# Patient Record
Sex: FEMALE | Race: WHITE | HISPANIC OR LATINO | Employment: STUDENT | URBAN - NONMETROPOLITAN AREA
[De-identification: names, ages, dates, MRNs, and addresses within clinical notes are randomized per-mention and may not be internally consistent; named-entity substitution may affect disease eponyms.]

---

## 2023-08-01 ENCOUNTER — OFFICE VISIT (OUTPATIENT)
Dept: PSYCHIATRY | Facility: CLINIC | Age: 23
End: 2023-08-01
Payer: COMMERCIAL

## 2023-08-01 VITALS
BODY MASS INDEX: 40.34 KG/M2 | WEIGHT: 257 LBS | SYSTOLIC BLOOD PRESSURE: 132 MMHG | HEART RATE: 92 BPM | HEIGHT: 67 IN | DIASTOLIC BLOOD PRESSURE: 82 MMHG

## 2023-08-01 DIAGNOSIS — F33.0 MILD RECURRENT MAJOR DEPRESSION: ICD-10-CM

## 2023-08-01 DIAGNOSIS — Z79.899 MEDICATION MANAGEMENT: ICD-10-CM

## 2023-08-01 DIAGNOSIS — F90.8 ATTENTION DEFICIT HYPERACTIVITY DISORDER (ADHD), OTHER TYPE: Primary | ICD-10-CM

## 2023-08-01 DIAGNOSIS — F41.1 GAD (GENERALIZED ANXIETY DISORDER): ICD-10-CM

## 2023-08-01 PROCEDURE — 1159F MED LIST DOCD IN RCRD: CPT | Performed by: NURSE PRACTITIONER

## 2023-08-01 PROCEDURE — 1160F RVW MEDS BY RX/DR IN RCRD: CPT | Performed by: NURSE PRACTITIONER

## 2023-08-01 PROCEDURE — 90792 PSYCH DIAG EVAL W/MED SRVCS: CPT | Performed by: NURSE PRACTITIONER

## 2023-08-01 RX ORDER — SERTRALINE HCL 50 MG
50 TABLET ORAL DAILY
COMMUNITY
Start: 2022-08-16

## 2023-08-01 RX ORDER — SYRINGE WITH NEEDLE, 1 ML 25GX5/8"
SYRINGE, EMPTY DISPOSABLE MISCELLANEOUS
COMMUNITY
Start: 2023-06-11

## 2023-08-01 RX ORDER — ATOMOXETINE 40 MG/1
40 CAPSULE ORAL DAILY
COMMUNITY
Start: 2022-08-16

## 2023-08-01 RX ORDER — TESTOSTERONE CYPIONATE 100 MG/ML
0.75 VIAL (ML) INTRAMUSCULAR WEEKLY
COMMUNITY
Start: 2022-07-13

## 2023-08-01 RX ORDER — HYDROXYZINE HYDROCHLORIDE 10 MG/1
10 TABLET, FILM COATED ORAL NIGHTLY PRN
COMMUNITY
Start: 2023-04-21

## 2023-08-08 ENCOUNTER — TELEPHONE (OUTPATIENT)
Dept: PSYCHIATRY | Facility: CLINIC | Age: 23
End: 2023-08-08
Payer: COMMERCIAL

## 2023-08-08 DIAGNOSIS — F90.8 ATTENTION DEFICIT HYPERACTIVITY DISORDER (ADHD), OTHER TYPE: Primary | ICD-10-CM

## 2023-08-17 LAB — DRUGS UR: NORMAL

## 2023-09-26 ENCOUNTER — TELEPHONE (OUTPATIENT)
Dept: PSYCHIATRY | Facility: CLINIC | Age: 23
End: 2023-09-26

## 2023-10-09 ENCOUNTER — TELEMEDICINE (OUTPATIENT)
Dept: PSYCHIATRY | Facility: CLINIC | Age: 23
End: 2023-10-09
Payer: COMMERCIAL

## 2023-10-09 DIAGNOSIS — F90.8 ATTENTION DEFICIT HYPERACTIVITY DISORDER (ADHD), OTHER TYPE: Primary | Chronic | ICD-10-CM

## 2023-10-09 DIAGNOSIS — F33.0 MILD RECURRENT MAJOR DEPRESSION: Chronic | ICD-10-CM

## 2023-10-09 DIAGNOSIS — F41.1 GAD (GENERALIZED ANXIETY DISORDER): Chronic | ICD-10-CM

## 2023-10-09 PROCEDURE — 1160F RVW MEDS BY RX/DR IN RCRD: CPT | Performed by: NURSE PRACTITIONER

## 2023-10-09 PROCEDURE — 1159F MED LIST DOCD IN RCRD: CPT | Performed by: NURSE PRACTITIONER

## 2023-10-09 PROCEDURE — 99214 OFFICE O/P EST MOD 30 MIN: CPT | Performed by: NURSE PRACTITIONER

## 2023-10-09 RX ORDER — NEEDLES, DISPOSABLE 25GX5/8"
NEEDLE, DISPOSABLE MISCELLANEOUS
COMMUNITY
Start: 2023-09-22

## 2023-10-09 RX ORDER — LISDEXAMFETAMINE DIMESYLATE CAPSULES 40 MG/1
40 CAPSULE ORAL EVERY MORNING
Qty: 30 CAPSULE | Refills: 0 | Status: SHIPPED | OUTPATIENT
Start: 2023-10-09

## 2023-10-09 RX ORDER — LORATADINE 10 MG
TABLET ORAL
COMMUNITY
Start: 2023-09-22

## 2023-10-09 RX ORDER — SERTRALINE HYDROCHLORIDE 100 MG/1
100 TABLET, FILM COATED ORAL DAILY
Qty: 30 TABLET | Refills: 2 | Status: SHIPPED | OUTPATIENT
Start: 2023-10-09 | End: 2024-10-08

## 2023-10-09 RX ORDER — HYDROXYZINE HYDROCHLORIDE 10 MG/1
10 TABLET, FILM COATED ORAL NIGHTLY PRN
Qty: 30 TABLET | Refills: 2 | Status: SHIPPED | OUTPATIENT
Start: 2023-10-09

## 2023-12-11 ENCOUNTER — TELEMEDICINE (OUTPATIENT)
Dept: PSYCHIATRY | Facility: CLINIC | Age: 23
End: 2023-12-11
Payer: COMMERCIAL

## 2023-12-11 DIAGNOSIS — F90.8 ATTENTION DEFICIT HYPERACTIVITY DISORDER (ADHD), OTHER TYPE: Chronic | ICD-10-CM

## 2023-12-11 DIAGNOSIS — F33.1 MODERATE RECURRENT MAJOR DEPRESSION: Primary | Chronic | ICD-10-CM

## 2023-12-11 DIAGNOSIS — F41.1 GAD (GENERALIZED ANXIETY DISORDER): Chronic | ICD-10-CM

## 2023-12-11 PROCEDURE — 99214 OFFICE O/P EST MOD 30 MIN: CPT | Performed by: NURSE PRACTITIONER

## 2023-12-11 PROCEDURE — 1159F MED LIST DOCD IN RCRD: CPT | Performed by: NURSE PRACTITIONER

## 2023-12-11 PROCEDURE — 1160F RVW MEDS BY RX/DR IN RCRD: CPT | Performed by: NURSE PRACTITIONER

## 2023-12-11 RX ORDER — TESTOSTERONE CYPIONATE 200 MG/ML
INJECTION, SOLUTION INTRAMUSCULAR
COMMUNITY
Start: 2023-12-05

## 2023-12-11 RX ORDER — LISDEXAMFETAMINE DIMESYLATE CAPSULES 40 MG/1
40 CAPSULE ORAL EVERY MORNING
Qty: 30 CAPSULE | Refills: 0 | Status: SHIPPED | OUTPATIENT
Start: 2023-12-11

## 2023-12-11 RX ORDER — BUPROPION HYDROCHLORIDE 150 MG/1
150 TABLET ORAL DAILY
Qty: 30 TABLET | Refills: 2 | Status: SHIPPED | OUTPATIENT
Start: 2023-12-11

## 2024-01-10 DIAGNOSIS — F90.8 ATTENTION DEFICIT HYPERACTIVITY DISORDER (ADHD), OTHER TYPE: Chronic | ICD-10-CM

## 2024-01-10 DIAGNOSIS — F33.1 MODERATE RECURRENT MAJOR DEPRESSION: Chronic | ICD-10-CM

## 2024-01-10 RX ORDER — BUPROPION HYDROCHLORIDE 150 MG/1
150 TABLET ORAL DAILY
Qty: 30 TABLET | Refills: 2 | OUTPATIENT
Start: 2024-01-10

## 2024-01-10 RX ORDER — LISDEXAMFETAMINE DIMESYLATE CAPSULES 40 MG/1
40 CAPSULE ORAL EVERY MORNING
Qty: 30 CAPSULE | Refills: 0 | Status: SHIPPED | OUTPATIENT
Start: 2024-01-10

## 2024-02-01 ENCOUNTER — E-VISIT (OUTPATIENT)
Dept: FAMILY MEDICINE CLINIC | Facility: TELEHEALTH | Age: 24
End: 2024-02-01
Payer: COMMERCIAL

## 2024-02-01 ENCOUNTER — TELEMEDICINE (OUTPATIENT)
Dept: PSYCHIATRY | Facility: CLINIC | Age: 24
End: 2024-02-01
Payer: COMMERCIAL

## 2024-02-01 DIAGNOSIS — F41.1 GAD (GENERALIZED ANXIETY DISORDER): Chronic | ICD-10-CM

## 2024-02-01 DIAGNOSIS — F90.8 ATTENTION DEFICIT HYPERACTIVITY DISORDER (ADHD), OTHER TYPE: Primary | Chronic | ICD-10-CM

## 2024-02-01 DIAGNOSIS — F33.0 MILD RECURRENT MAJOR DEPRESSION: Chronic | ICD-10-CM

## 2024-02-01 PROCEDURE — 1159F MED LIST DOCD IN RCRD: CPT | Performed by: NURSE PRACTITIONER

## 2024-02-01 PROCEDURE — 99214 OFFICE O/P EST MOD 30 MIN: CPT | Performed by: NURSE PRACTITIONER

## 2024-02-01 PROCEDURE — 1160F RVW MEDS BY RX/DR IN RCRD: CPT | Performed by: NURSE PRACTITIONER

## 2024-02-01 RX ORDER — LISDEXAMFETAMINE DIMESYLATE CAPSULES 50 MG/1
50 CAPSULE ORAL EVERY MORNING
Qty: 30 CAPSULE | Refills: 0 | Status: SHIPPED | OUTPATIENT
Start: 2024-02-01

## 2024-02-16 ENCOUNTER — TELEMEDICINE (OUTPATIENT)
Dept: FAMILY MEDICINE CLINIC | Facility: TELEHEALTH | Age: 24
End: 2024-02-16
Payer: COMMERCIAL

## 2024-02-16 VITALS — WEIGHT: 257 LBS | HEIGHT: 55 IN | BODY MASS INDEX: 59.47 KG/M2

## 2024-02-16 DIAGNOSIS — T78.40XA ALLERGIC REACTION, INITIAL ENCOUNTER: Primary | ICD-10-CM

## 2024-02-16 PROBLEM — Z91.048: Status: ACTIVE | Noted: 2024-02-15

## 2024-02-16 RX ORDER — PREDNISONE 20 MG/1
20 TABLET ORAL 2 TIMES DAILY
Qty: 6 TABLET | Refills: 0 | Status: SHIPPED | OUTPATIENT
Start: 2024-02-16 | End: 2024-02-19

## 2024-02-16 RX ORDER — DIPHENHYDRAMINE HCL 25 MG
25 TABLET ORAL NIGHTLY PRN
Qty: 24 TABLET | Refills: 0 | Status: SHIPPED | OUTPATIENT
Start: 2024-02-16

## 2024-02-23 DIAGNOSIS — F90.8 ATTENTION DEFICIT HYPERACTIVITY DISORDER (ADHD), OTHER TYPE: Chronic | ICD-10-CM

## 2024-02-23 RX ORDER — LISDEXAMFETAMINE DIMESYLATE CAPSULES 50 MG/1
50 CAPSULE ORAL EVERY MORNING
Qty: 30 CAPSULE | Refills: 0 | OUTPATIENT
Start: 2024-02-23

## 2024-03-03 DIAGNOSIS — F33.1 MODERATE RECURRENT MAJOR DEPRESSION: Chronic | ICD-10-CM

## 2024-03-04 ENCOUNTER — TELEPHONE (OUTPATIENT)
Dept: PSYCHIATRY | Facility: CLINIC | Age: 24
End: 2024-03-04
Payer: COMMERCIAL

## 2024-03-04 RX ORDER — BUPROPION HYDROCHLORIDE 150 MG/1
150 TABLET ORAL DAILY
Qty: 30 TABLET | Refills: 2 | Status: SHIPPED | OUTPATIENT
Start: 2024-03-04

## 2024-03-25 ENCOUNTER — E-VISIT (OUTPATIENT)
Dept: FAMILY MEDICINE CLINIC | Facility: TELEHEALTH | Age: 24
End: 2024-03-25
Payer: COMMERCIAL

## 2024-04-03 ENCOUNTER — TELEPHONE (OUTPATIENT)
Dept: INTERNAL MEDICINE | Facility: CLINIC | Age: 24
End: 2024-04-03
Payer: COMMERCIAL

## 2024-04-09 ENCOUNTER — TELEMEDICINE (OUTPATIENT)
Dept: PSYCHIATRY | Facility: CLINIC | Age: 24
End: 2024-04-09
Payer: COMMERCIAL

## 2024-04-09 DIAGNOSIS — F33.1 MODERATE RECURRENT MAJOR DEPRESSION: Primary | Chronic | ICD-10-CM

## 2024-04-09 DIAGNOSIS — F41.1 GAD (GENERALIZED ANXIETY DISORDER): Chronic | ICD-10-CM

## 2024-04-09 DIAGNOSIS — F90.8 ATTENTION DEFICIT HYPERACTIVITY DISORDER (ADHD), OTHER TYPE: Chronic | ICD-10-CM

## 2024-04-09 PROCEDURE — 1159F MED LIST DOCD IN RCRD: CPT | Performed by: NURSE PRACTITIONER

## 2024-04-09 PROCEDURE — 99214 OFFICE O/P EST MOD 30 MIN: CPT | Performed by: NURSE PRACTITIONER

## 2024-04-09 PROCEDURE — 1160F RVW MEDS BY RX/DR IN RCRD: CPT | Performed by: NURSE PRACTITIONER

## 2024-04-09 RX ORDER — LISDEXAMFETAMINE DIMESYLATE CAPSULES 50 MG/1
50 CAPSULE ORAL EVERY MORNING
Qty: 30 CAPSULE | Refills: 0 | Status: SHIPPED | OUTPATIENT
Start: 2024-04-09 | End: 2024-04-10 | Stop reason: SDUPTHER

## 2024-04-10 DIAGNOSIS — F90.8 ATTENTION DEFICIT HYPERACTIVITY DISORDER (ADHD), OTHER TYPE: Chronic | ICD-10-CM

## 2024-04-10 RX ORDER — LISDEXAMFETAMINE DIMESYLATE CAPSULES 50 MG/1
50 CAPSULE ORAL EVERY MORNING
Qty: 30 CAPSULE | Refills: 0 | Status: SHIPPED | OUTPATIENT
Start: 2024-04-10

## 2024-04-12 ENCOUNTER — OFFICE VISIT (OUTPATIENT)
Dept: INTERNAL MEDICINE | Facility: CLINIC | Age: 24
End: 2024-04-12
Payer: COMMERCIAL

## 2024-04-12 VITALS
TEMPERATURE: 98.2 F | OXYGEN SATURATION: 97 % | HEIGHT: 67 IN | DIASTOLIC BLOOD PRESSURE: 84 MMHG | HEART RATE: 78 BPM | BODY MASS INDEX: 39.24 KG/M2 | SYSTOLIC BLOOD PRESSURE: 134 MMHG | WEIGHT: 250 LBS

## 2024-04-12 DIAGNOSIS — E03.9 HYPOTHYROIDISM, UNSPECIFIED TYPE: ICD-10-CM

## 2024-04-12 DIAGNOSIS — Z13.29 SCREENING FOR ENDOCRINE, METABOLIC AND IMMUNITY DISORDER: ICD-10-CM

## 2024-04-12 DIAGNOSIS — E55.9 VITAMIN D DEFICIENCY: ICD-10-CM

## 2024-04-12 DIAGNOSIS — Z76.89 ENCOUNTER TO ESTABLISH CARE: Primary | ICD-10-CM

## 2024-04-12 DIAGNOSIS — R06.83 SNORES: ICD-10-CM

## 2024-04-12 DIAGNOSIS — Z13.0 SCREENING FOR ENDOCRINE, METABOLIC AND IMMUNITY DISORDER: ICD-10-CM

## 2024-04-12 DIAGNOSIS — Z13.228 SCREENING FOR ENDOCRINE, METABOLIC AND IMMUNITY DISORDER: ICD-10-CM

## 2024-04-12 DIAGNOSIS — R53.83 OTHER FATIGUE: ICD-10-CM

## 2024-04-12 DIAGNOSIS — L65.9 HAIR LOSS: ICD-10-CM

## 2024-04-12 DIAGNOSIS — Z13.0 SCREENING FOR DISORDER OF BLOOD AND BLOOD-FORMING ORGANS: ICD-10-CM

## 2024-04-12 DIAGNOSIS — R06.81 WITNESSED EPISODE OF APNEA: ICD-10-CM

## 2024-04-15 ENCOUNTER — PATIENT MESSAGE (OUTPATIENT)
Dept: INTERNAL MEDICINE | Facility: CLINIC | Age: 24
End: 2024-04-15
Payer: COMMERCIAL

## 2024-04-15 DIAGNOSIS — E55.9 VITAMIN D DEFICIENCY: Primary | ICD-10-CM

## 2024-04-15 LAB
25(OH)D3+25(OH)D2 SERPL-MCNC: 12.7 NG/ML (ref 30–100)
ALBUMIN SERPL-MCNC: 4.5 G/DL (ref 4–5)
ALBUMIN/GLOB SERPL: 1.7 {RATIO} (ref 1.2–2.2)
ALP SERPL-CCNC: 149 IU/L (ref 44–121)
ALT SERPL-CCNC: 39 IU/L (ref 0–32)
AST SERPL-CCNC: 23 IU/L (ref 0–40)
BASOPHILS # BLD AUTO: 0.1 X10E3/UL (ref 0–0.2)
BASOPHILS NFR BLD AUTO: 1 %
BILIRUB SERPL-MCNC: 0.6 MG/DL (ref 0–1.2)
BUN SERPL-MCNC: 10 MG/DL (ref 6–20)
BUN/CREAT SERPL: 8 (ref 9–23)
CALCIUM SERPL-MCNC: 9.5 MG/DL (ref 8.7–10.2)
CHLORIDE SERPL-SCNC: 101 MMOL/L (ref 96–106)
CO2 SERPL-SCNC: 25 MMOL/L (ref 20–29)
CREAT SERPL-MCNC: 1.19 MG/DL (ref 0.57–1)
EGFRCR SERPLBLD CKD-EPI 2021: 66 ML/MIN/1.73
EOSINOPHIL # BLD AUTO: 0.2 X10E3/UL (ref 0–0.4)
EOSINOPHIL NFR BLD AUTO: 2 %
ERYTHROCYTE [DISTWIDTH] IN BLOOD BY AUTOMATED COUNT: 12.6 % (ref 11.7–15.4)
FERRITIN SERPL-MCNC: 123 NG/ML (ref 15–150)
FOLATE SERPL-MCNC: 4.5 NG/ML
GLOBULIN SER CALC-MCNC: 2.7 G/DL (ref 1.5–4.5)
GLUCOSE SERPL-MCNC: 70 MG/DL (ref 70–99)
HBA1C MFR BLD: 5.4 % (ref 4.8–5.6)
HCT VFR BLD AUTO: 49.4 % (ref 34–46.6)
HGB BLD-MCNC: 15.5 G/DL (ref 11.1–15.9)
IMM GRANULOCYTES # BLD AUTO: 0 X10E3/UL (ref 0–0.1)
IMM GRANULOCYTES NFR BLD AUTO: 0 %
IRON SERPL-MCNC: 78 UG/DL (ref 27–159)
LYMPHOCYTES # BLD AUTO: 2.7 X10E3/UL (ref 0.7–3.1)
LYMPHOCYTES NFR BLD AUTO: 28 %
MCH RBC QN AUTO: 25.1 PG (ref 26.6–33)
MCHC RBC AUTO-ENTMCNC: 31.4 G/DL (ref 31.5–35.7)
MCV RBC AUTO: 80 FL (ref 79–97)
MONOCYTES # BLD AUTO: 0.8 X10E3/UL (ref 0.1–0.9)
MONOCYTES NFR BLD AUTO: 8 %
NEUTROPHILS # BLD AUTO: 5.9 X10E3/UL (ref 1.4–7)
NEUTROPHILS NFR BLD AUTO: 61 %
PLATELET # BLD AUTO: 283 X10E3/UL (ref 150–450)
POTASSIUM SERPL-SCNC: 4.5 MMOL/L (ref 3.5–5.2)
PROT SERPL-MCNC: 7.2 G/DL (ref 6–8.5)
RBC # BLD AUTO: 6.17 X10E6/UL (ref 3.77–5.28)
RPR SER QL: NON REACTIVE
SODIUM SERPL-SCNC: 142 MMOL/L (ref 134–144)
T3 SERPL-MCNC: 151 NG/DL (ref 71–180)
T3FREE SERPL-MCNC: 4 PG/ML (ref 2–4.4)
T4 FREE SERPL-MCNC: 1.13 NG/DL (ref 0.82–1.77)
THYROGLOB AB SERPL-ACNC: <1 IU/ML (ref 0–0.9)
THYROPEROXIDASE AB SERPL-ACNC: 15 IU/ML (ref 0–34)
TSH SERPL DL<=0.005 MIU/L-ACNC: 6.88 UIU/ML (ref 0.45–4.5)
VIT B12 SERPL-MCNC: 460 PG/ML (ref 232–1245)
WBC # BLD AUTO: 9.7 X10E3/UL (ref 3.4–10.8)
ZINC SERPL-MCNC: 79 UG/DL (ref 44–115)

## 2024-04-15 RX ORDER — LEVOTHYROXINE SODIUM 0.05 MG/1
50 TABLET ORAL DAILY
Qty: 30 TABLET | Refills: 1 | Status: SHIPPED | OUTPATIENT
Start: 2024-04-15

## 2024-04-15 RX ORDER — ERGOCALCIFEROL 1.25 MG/1
50000 CAPSULE ORAL WEEKLY
Qty: 12 CAPSULE | Refills: 1 | Status: SHIPPED | OUTPATIENT
Start: 2024-04-15

## 2024-04-17 DIAGNOSIS — E55.9 VITAMIN D DEFICIENCY: ICD-10-CM

## 2024-06-14 DIAGNOSIS — E03.9 HYPOTHYROIDISM, UNSPECIFIED TYPE: ICD-10-CM

## 2024-06-14 RX ORDER — LEVOTHYROXINE SODIUM 0.05 MG/1
50 TABLET ORAL DAILY
Qty: 30 TABLET | Refills: 1 | Status: SHIPPED | OUTPATIENT
Start: 2024-06-14

## 2024-07-03 DIAGNOSIS — F33.1 MODERATE RECURRENT MAJOR DEPRESSION: Chronic | ICD-10-CM

## 2024-07-03 RX ORDER — BUPROPION HYDROCHLORIDE 150 MG/1
150 TABLET ORAL DAILY
Qty: 30 TABLET | Refills: 2 | Status: SHIPPED | OUTPATIENT
Start: 2024-07-03

## 2024-07-08 ENCOUNTER — TELEMEDICINE (OUTPATIENT)
Dept: PSYCHIATRY | Facility: CLINIC | Age: 24
End: 2024-07-08
Payer: COMMERCIAL

## 2024-07-08 DIAGNOSIS — F90.8 ATTENTION DEFICIT HYPERACTIVITY DISORDER (ADHD), OTHER TYPE: Primary | Chronic | ICD-10-CM

## 2024-07-08 DIAGNOSIS — F33.0 MILD RECURRENT MAJOR DEPRESSION: Chronic | ICD-10-CM

## 2024-07-08 DIAGNOSIS — F41.1 GAD (GENERALIZED ANXIETY DISORDER): Chronic | ICD-10-CM

## 2024-07-08 PROCEDURE — 1159F MED LIST DOCD IN RCRD: CPT | Performed by: NURSE PRACTITIONER

## 2024-07-08 PROCEDURE — 1160F RVW MEDS BY RX/DR IN RCRD: CPT | Performed by: NURSE PRACTITIONER

## 2024-07-08 PROCEDURE — 99214 OFFICE O/P EST MOD 30 MIN: CPT | Performed by: NURSE PRACTITIONER

## 2024-07-08 RX ORDER — DEXTROAMPHETAMINE SACCHARATE, AMPHETAMINE ASPARTATE MONOHYDRATE, DEXTROAMPHETAMINE SULFATE AND AMPHETAMINE SULFATE 5; 5; 5; 5 MG/1; MG/1; MG/1; MG/1
20 CAPSULE, EXTENDED RELEASE ORAL DAILY
Qty: 30 CAPSULE | Refills: 0 | Status: SHIPPED | OUTPATIENT
Start: 2024-07-08 | End: 2025-07-08

## 2024-08-26 ENCOUNTER — TELEMEDICINE (OUTPATIENT)
Dept: PSYCHIATRY | Facility: CLINIC | Age: 24
End: 2024-08-26
Payer: COMMERCIAL

## 2024-08-26 DIAGNOSIS — F41.1 GAD (GENERALIZED ANXIETY DISORDER): Primary | Chronic | ICD-10-CM

## 2024-08-26 DIAGNOSIS — F33.0 MILD RECURRENT MAJOR DEPRESSION: Chronic | ICD-10-CM

## 2024-08-26 DIAGNOSIS — F90.8 ATTENTION DEFICIT HYPERACTIVITY DISORDER (ADHD), OTHER TYPE: Chronic | ICD-10-CM

## 2024-08-26 DIAGNOSIS — Z79.899 MEDICATION MANAGEMENT: ICD-10-CM

## 2024-08-26 PROCEDURE — 1160F RVW MEDS BY RX/DR IN RCRD: CPT | Performed by: NURSE PRACTITIONER

## 2024-08-26 PROCEDURE — 99214 OFFICE O/P EST MOD 30 MIN: CPT | Performed by: NURSE PRACTITIONER

## 2024-08-26 PROCEDURE — 1159F MED LIST DOCD IN RCRD: CPT | Performed by: NURSE PRACTITIONER

## 2024-08-26 RX ORDER — BUSPIRONE HYDROCHLORIDE 10 MG/1
10 TABLET ORAL 2 TIMES DAILY PRN
Qty: 60 TABLET | Refills: 2 | Status: SHIPPED | OUTPATIENT
Start: 2024-08-26

## 2024-08-26 RX ORDER — DEXTROAMPHETAMINE SACCHARATE, AMPHETAMINE ASPARTATE MONOHYDRATE, DEXTROAMPHETAMINE SULFATE AND AMPHETAMINE SULFATE 5; 5; 5; 5 MG/1; MG/1; MG/1; MG/1
20 CAPSULE, EXTENDED RELEASE ORAL DAILY
Qty: 30 CAPSULE | Refills: 0 | Status: SHIPPED | OUTPATIENT
Start: 2024-08-26 | End: 2025-08-26

## 2024-09-17 DIAGNOSIS — E55.9 VITAMIN D DEFICIENCY: ICD-10-CM

## 2024-09-17 RX ORDER — ERGOCALCIFEROL 1.25 MG/1
50000 CAPSULE, LIQUID FILLED ORAL
Qty: 12 CAPSULE | Refills: 1 | Status: SHIPPED | OUTPATIENT
Start: 2024-09-17

## 2024-10-10 DIAGNOSIS — E03.9 HYPOTHYROIDISM, UNSPECIFIED TYPE: ICD-10-CM

## 2024-10-10 DIAGNOSIS — F33.1 MODERATE RECURRENT MAJOR DEPRESSION: Chronic | ICD-10-CM

## 2024-10-10 RX ORDER — BUPROPION HYDROCHLORIDE 150 MG/1
150 TABLET ORAL DAILY
Qty: 30 TABLET | Refills: 2 | Status: SHIPPED | OUTPATIENT
Start: 2024-10-10

## 2024-10-10 RX ORDER — LEVOTHYROXINE SODIUM 50 UG/1
50 TABLET ORAL DAILY
Qty: 30 TABLET | Refills: 1 | OUTPATIENT
Start: 2024-10-10

## 2024-10-21 DIAGNOSIS — F90.9 ATTENTION DEFICIT HYPERACTIVITY DISORDER (ADHD), UNSPECIFIED ADHD TYPE: Primary | ICD-10-CM

## 2024-10-21 DIAGNOSIS — Z79.899 MEDICATION MANAGEMENT: ICD-10-CM

## 2024-10-21 DIAGNOSIS — F41.1 GAD (GENERALIZED ANXIETY DISORDER): ICD-10-CM

## 2024-10-23 NOTE — PROGRESS NOTES
"This provider is located at [facility] ,[address], [address] in [place], [place],using a secure MyChart Video Visit through App[employer]. Patient is being seen remotely via telehealth at their home address in [place], and stated they are in a secure environment for this session. The patient's condition being diagnosed/treated is appropriate for telemedicine. The provider identified herself as well as her credentials.   The patient, and/or patients guardian, consent to be seen remotely, and when consent is given they understand that the consent allows for patient identifiable information to be sent to a third party as needed.   They may refuse to be seen remotely at any time. The electronic data is encrypted and password protected, and the patient and/or guardian has been advised of the potential risks to privacy not withstanding such measures.    Chief Complaint  Depression, anxiety, and ADHD combined type      Subjective          Carrie Ramirez presents via MyChart through Epic by himself for a follow up and medication check. The patient prefers pronouns he/him.     History of Present Illness: Carrie states, \"I am good.\"  Carrie tells me that they are very busy between work and taking graduate classes.  They work 4 days a week and 5-hour shifts.  They recently changed from a private office setting to a shared space which has been an adjustment.  There are times they can be very reactive with others.  They are unsure if it is related to depression, anxiety, or difficulty with regulating their nervous system.  They find themselves to be \"snappy\" and their reactions often exceed the situation.  This leads to guilt feelings.  They do know that stress can be a contributing factor.  Their current relationship has improved which is also helpful.  They are sleeping since changing from Vyvanse to Adderall.  The BuSpar has been helpful for anxiety.  There are times in the evening they " "feel overly hungry and may overeat when the Adderall is no longer in effect.  He is taking Buspar, Wellbutrin XL, Adderall XR, and Hydroxyzine.  He denies any SI/HI/AVH.    Current Medications:   Current Outpatient Medications   Medication Sig Dispense Refill    amphetamine-dextroamphetamine XR (ADDERALL XR) 20 MG 24 hr capsule Take 1 capsule by mouth Daily 30 capsule 0    B-D 3CC LUER-GAIL SYR 25GX5/8\" 25G X 5/8\" 3 ML misc USE FOR SUBCUTANEOUS INJECTION OF MEDICATION WEEKLY AS DIRECTED      BD Hypodermic Needle 18G X 1\" misc USE TO DRAW UP MEDICATION FOR INJECTION WEEKLY      buPROPion XL (WELLBUTRIN XL) 150 MG 24 hr tablet TAKE 1 TABLET BY MOUTH EVERY DAY 30 tablet 2    busPIRone (BUSPAR) 10 MG tablet Take 1 tablet by mouth 2 (Two) Times a Day As Needed (anxiety). 60 tablet 2    Calcium Carbonate 1500 (600 Ca) MG tablet Take 1 tablet by mouth Daily. 90 tablet 1    estradiol (ESTRACE) 0.1 MG/GM vaginal cream INSERT 1 GRAM VAGINALLY 2 TIMES EVERY WEEK      FINASTERIDE-MINOXIDIL EX       hydrOXYzine (ATARAX) 10 MG tablet Take 1 tablet by mouth At Night As Needed for Anxiety. for sleep 30 tablet 2    levothyroxine (SYNTHROID, LEVOTHROID) 50 MCG tablet TAKE 1 TABLET BY MOUTH EVERY DAY 30 tablet 1    Testosterone Cypionate (DEPOTESTOTERONE CYPIONATE) 200 MG/ML injection 1 mL 1 (One) Time Per Week. 0.25 ml      vitamin D (ERGOCALCIFEROL) 1.25 MG (48302 UT) capsule capsule TAKE 1 CAPSULE BY MOUTH 1 TIME PER WEEK. 12 capsule 1     No current facility-administered medications for this visit.         Objective   Vital Signs:   There were no vitals taken for this visit.    Physical Exam  Nursing note reviewed. Vitals reviewed: No vitals due to nature of telehealth visit.  Constitutional:       Appearance: Normal appearance. He is obese.   Neurological:      General: No focal deficit present.      Mental Status: He is alert and oriented to person, place, and time.        The following data was reviewed by: Dahlia Pearson " RICKEY Connolly on 10/28/2024:    UC with Satya Chopra PA-C (09/26/2024)     Assessment and Plan    Diagnoses and all orders for this visit:    1. Mild recurrent major depression (Primary)    2. ABELARDO (generalized anxiety disorder)    3. ADHD (attention deficit hyperactivity disorder), combined type  -     amphetamine-dextroamphetamine XR (ADDERALL XR) 20 MG 24 hr capsule; Take 1 capsule by mouth Daily  Dispense: 30 capsule; Refill: 0                MENTAL STATUS EXAM   General Appearance:  Cleanly groomed and dressed and well developed  Eye Contact:  Fair  Attitude:  Cooperative  Motor Activity:  Normal gait, posture  Muscle Strength:  Normal  Speech:  Rapid  Language:  Spontaneous  Mood and affect:  Anxious  Hopelessness:  Denies  Loneliness: Denies  Thought Process:  Logical, goal-directed and linear  Associations/ Thought Content:  No delusions  Hallucinations:  None  Suicidal Ideations:  Not present  Homicidal Ideation:  Not present  Sensorium:  Alert and clear  Orientation:  Person, place, time and situation  Immediate Recall, Recent, and Remote Memory:  Intact  Attention Span/ Concentration:  Good  Fund of Knowledge:  Appropriate for age and educational level  Intellectual Functioning:  Above average  Insight:  Good  Judgement:  Good  Reliability:  Good  Impulse Control:  Good       PHQ-9 Score:   PHQ-9 Total Score:       ABELARDO-7 Score:  AEBLARDO 7 Total Score: (Patient-Rptd) 11    Impression/Plan:  -This is a follow up and medication check. Carrie reports some ongoing concerns for being overly reactive and snappy.  They worry about seasonal affective disorder and their depression worsening during the winter months.  They have been considering starting therapy when they are scheduled changes this week.  They have a therapist in mind, but have not scheduled.  We discussed possibly increasing Wellbutrin.  They are open to starting therapy first, but understand they could call for an increased dose if needed.   They will continue to use BuSpar for anxiety.  We also discussed implementing grounding techniques which may help self-regulation of emotions.  They report good focus, concentration, and attention.  They are sleeping and eating well.  At this time, we agreed to continue medication as prescribed and will follow up in person in 3 months.  -Continue Buspar 10 mg two times daily as needed for anxiety.  Patient has refills.  -Continue Adderall XR 20 mg daily for ADHD.   -Continue Wellbutrin  mg daily for depression. Patient has refills.  -Continue Hydroxyzine 10 mg nightly for anxiety. Patient has refills.   -Consider counseling.  -Patient has an appointment to monitor thyroid  -The JACQUELINE report, reviewed through PDMP, of the past 12 months were reviewed and is appropriate.  The patient/guardian reports taking the medication only as prescribed.  The patient/guardian denies any abuse or misuse of the medication.  The patient/guardian denies any other substance use or issues.  There are no apparent substance related issues.  The patient reports no side effects of the current medication usage.  The patient/guardian has reported significant improvement with medication usage and wishes to continue medication as prescribed.  The patient/guardian is appropriate to continue with current medication usage at this time.  Reinforced risks and side effects of medication usage, patient and/or guardian verbalize understanding in their own words and are in agreement with current plan.   -Last UDS on 10/25/24.  Negative for all substances.  The patient has read and signed the [facility] Controlled Substance Contract. We discussed the risks and benefits of the use of controlled substances, including the risk of tolerance and drug dependence. Anorectic medications can be prescribed by one provider at a time and dispensed from one facility at a time, they can only be taken as prescribed, and we are not obligated to refill them if  lost or stolen. The refills are only during regular clinic hours. Dionisio report was pulled on patient, reviewed and found to be appropriate.            MEDS ORDERED DURING VISIT:  New Medications Ordered This Visit   Medications    amphetamine-dextroamphetamine XR (ADDERALL XR) 20 MG 24 hr capsule     Sig: Take 1 capsule by mouth Daily     Dispense:  30 capsule     Refill:  0         Follow Up   Return for Medication Check.  Patient was given instructions and counseling regarding his condition or for health maintenance advice. Please see specific information pulled into the AVS if appropriate.       TREATMENT PLAN/GOALS: Continue supportive psychotherapy efforts and medications as indicated. Treatment and medication options discussed during today's visit. Patient acknowledged and verbally consented to continue with current treatment plan and was educated on the importance of compliance with treatment and follow-up appointments.    MEDICATION ISSUES:  Discussed medication options and treatment plan of prescribed medication as well as the risks, benefits, and side effects including potential falls, possible impaired driving and metabolic adversities among others. Patient is agreeable to call the office with any worsening of symptoms or onset of side effects. Patient is agreeable to call 911 or go to the nearest ER should he/she begin having SI/HI.        This document has been electronically signed by RICKEY Duarte, PMHNP-BC  October 28, 2024 13:50 EDT    Part of this note may be an electronic transcription/translation of spoken language to printed text using the Dragon Dictation System.

## 2024-10-25 ENCOUNTER — TELEPHONE (OUTPATIENT)
Dept: PSYCHIATRY | Facility: CLINIC | Age: 24
End: 2024-10-25
Payer: COMMERCIAL

## 2024-10-25 ENCOUNTER — LAB (OUTPATIENT)
Dept: LAB | Facility: HOSPITAL | Age: 24
End: 2024-10-25
Payer: COMMERCIAL

## 2024-10-25 DIAGNOSIS — F90.8 OTHER SPECIFIED ATTENTION DEFICIT HYPERACTIVITY DISORDER (ADHD): ICD-10-CM

## 2024-10-25 DIAGNOSIS — Z79.899 MEDICATION MANAGEMENT: ICD-10-CM

## 2024-10-25 LAB
AMPHET+METHAMPHET UR QL: NEGATIVE
AMPHETAMINES UR QL: NEGATIVE
BARBITURATES UR QL SCN: NEGATIVE
BENZODIAZ UR QL SCN: NEGATIVE
BUPRENORPHINE SERPL-MCNC: NEGATIVE NG/ML
CANNABINOIDS SERPL QL: NEGATIVE
COCAINE UR QL: NEGATIVE
METHADONE UR QL SCN: NEGATIVE
OPIATES UR QL: NEGATIVE
OXYCODONE UR QL SCN: NEGATIVE
PCP UR QL SCN: NEGATIVE
TRICYCLICS UR QL SCN: NEGATIVE

## 2024-10-25 PROCEDURE — 80306 DRUG TEST PRSMV INSTRMNT: CPT

## 2024-10-28 ENCOUNTER — TELEMEDICINE (OUTPATIENT)
Dept: PSYCHIATRY | Facility: CLINIC | Age: 24
End: 2024-10-28
Payer: COMMERCIAL

## 2024-10-28 DIAGNOSIS — F41.1 GAD (GENERALIZED ANXIETY DISORDER): Chronic | ICD-10-CM

## 2024-10-28 DIAGNOSIS — F33.0 MILD RECURRENT MAJOR DEPRESSION: Primary | Chronic | ICD-10-CM

## 2024-10-28 DIAGNOSIS — F90.2 ADHD (ATTENTION DEFICIT HYPERACTIVITY DISORDER), COMBINED TYPE: Chronic | ICD-10-CM

## 2024-10-28 PROCEDURE — 1159F MED LIST DOCD IN RCRD: CPT | Performed by: NURSE PRACTITIONER

## 2024-10-28 PROCEDURE — 99214 OFFICE O/P EST MOD 30 MIN: CPT | Performed by: NURSE PRACTITIONER

## 2024-10-28 PROCEDURE — 1160F RVW MEDS BY RX/DR IN RCRD: CPT | Performed by: NURSE PRACTITIONER

## 2024-10-28 RX ORDER — ESTRADIOL 0.1 MG/G
CREAM VAGINAL
COMMUNITY
Start: 2024-08-14

## 2024-10-28 RX ORDER — SYRINGE WITH NEEDLE, 1 ML 25GX5/8"
SYRINGE, EMPTY DISPOSABLE MISCELLANEOUS
COMMUNITY
Start: 2024-09-24

## 2024-10-28 RX ORDER — NEEDLES, DISPOSABLE 25GX5/8"
NEEDLE, DISPOSABLE MISCELLANEOUS
COMMUNITY
Start: 2024-09-06

## 2024-10-28 RX ORDER — DEXTROAMPHETAMINE SACCHARATE, AMPHETAMINE ASPARTATE MONOHYDRATE, DEXTROAMPHETAMINE SULFATE AND AMPHETAMINE SULFATE 5; 5; 5; 5 MG/1; MG/1; MG/1; MG/1
20 CAPSULE, EXTENDED RELEASE ORAL DAILY
Qty: 30 CAPSULE | Refills: 0 | Status: SHIPPED | OUTPATIENT
Start: 2024-10-28 | End: 2025-10-28

## 2024-10-29 ENCOUNTER — LAB (OUTPATIENT)
Dept: LAB | Facility: HOSPITAL | Age: 24
End: 2024-10-29
Payer: COMMERCIAL

## 2024-10-29 ENCOUNTER — TRANSCRIBE ORDERS (OUTPATIENT)
Dept: LAB | Facility: HOSPITAL | Age: 24
End: 2024-10-29
Payer: COMMERCIAL

## 2024-10-29 DIAGNOSIS — F64.8 OTHER GENDER IDENTITY DISORDERS: ICD-10-CM

## 2024-10-29 DIAGNOSIS — Z79.899 OTHER LONG TERM (CURRENT) DRUG THERAPY: ICD-10-CM

## 2024-10-29 DIAGNOSIS — F64.8 OTHER GENDER IDENTITY DISORDERS: Primary | ICD-10-CM

## 2024-10-29 LAB
HCT VFR BLD AUTO: 46.8 % (ref 34–46.6)
HGB BLD-MCNC: 15.5 G/DL (ref 12–15.9)
TESTOST SERPL-MCNC: 199 NG/DL (ref 8.4–836)

## 2024-10-29 PROCEDURE — 85014 HEMATOCRIT: CPT

## 2024-10-29 PROCEDURE — 84439 ASSAY OF FREE THYROXINE: CPT | Performed by: NURSE PRACTITIONER

## 2024-10-29 PROCEDURE — 85018 HEMOGLOBIN: CPT

## 2024-10-29 PROCEDURE — 36415 COLL VENOUS BLD VENIPUNCTURE: CPT

## 2024-10-29 PROCEDURE — 84443 ASSAY THYROID STIM HORMONE: CPT | Performed by: NURSE PRACTITIONER

## 2024-10-29 PROCEDURE — 84403 ASSAY OF TOTAL TESTOSTERONE: CPT

## 2024-10-30 DIAGNOSIS — E03.9 HYPOTHYROIDISM, UNSPECIFIED TYPE: ICD-10-CM

## 2024-10-30 RX ORDER — LEVOTHYROXINE SODIUM 75 UG/1
75 TABLET ORAL DAILY
Qty: 30 TABLET | Refills: 1 | Status: SHIPPED | OUTPATIENT
Start: 2024-10-30

## 2024-11-05 DIAGNOSIS — E03.9 HYPOTHYROIDISM, UNSPECIFIED TYPE: ICD-10-CM

## 2024-11-05 RX ORDER — LEVOTHYROXINE SODIUM 50 UG/1
50 TABLET ORAL DAILY
Qty: 30 TABLET | Refills: 1 | Status: SHIPPED | OUTPATIENT
Start: 2024-11-05

## 2024-11-15 ENCOUNTER — OFFICE VISIT (OUTPATIENT)
Dept: INTERNAL MEDICINE | Facility: CLINIC | Age: 24
End: 2024-11-15
Payer: COMMERCIAL

## 2024-11-15 VITALS
HEIGHT: 67 IN | HEART RATE: 86 BPM | WEIGHT: 254 LBS | SYSTOLIC BLOOD PRESSURE: 130 MMHG | TEMPERATURE: 98.1 F | BODY MASS INDEX: 39.87 KG/M2 | OXYGEN SATURATION: 98 % | DIASTOLIC BLOOD PRESSURE: 88 MMHG

## 2024-11-15 DIAGNOSIS — Z86.2 HISTORY OF ANEMIA: ICD-10-CM

## 2024-11-15 DIAGNOSIS — R00.0 TACHYCARDIA: Primary | ICD-10-CM

## 2024-11-15 DIAGNOSIS — R22.1 NECK SWELLING: ICD-10-CM

## 2024-11-15 PROCEDURE — 99214 OFFICE O/P EST MOD 30 MIN: CPT | Performed by: NURSE PRACTITIONER

## 2024-11-15 PROCEDURE — 1160F RVW MEDS BY RX/DR IN RCRD: CPT | Performed by: NURSE PRACTITIONER

## 2024-11-15 PROCEDURE — 1159F MED LIST DOCD IN RCRD: CPT | Performed by: NURSE PRACTITIONER

## 2024-11-15 NOTE — PROGRESS NOTES
"     Office Visit      Patient Name: Carrie Ramirez  : 2000   MRN: [id number]     Chief Complaint:    Chief Complaint   Patient presents with    Irregular Heart Beat       History of Present Illness  Leonidas Ramirez is a 24 y.o. adult who presents for evaluation of palpitations. Transitioning from female to male.      He has been monitoring his heart rate using a smartwatch, which typically ranges between 60 to 80 beats per minute (BPM). However, he has observed occasional spikes to 100 to 160 BPM. He suspects these fluctuations may be due to a combination of his thyroid condition, ADHD medication, caffeine intake, or stress.    Despite these episodes, he does not experience any associated symptoms such as anxiety, chest pain, shortness of breath, sweating, nausea, headache, or dizziness. He also reports no episodes of rapid heart rate during sleep or any correlation with physical exertion or activity. He has not experienced any fainting spells or changes in vision.     He has no history of nosebleeds or blood in his stool. He has a past medical history of anemia. He has been on testosterone therapy since 2022 and started Adderall in 2023, having previously been on Vyvanse. He reports no side effects from these medications.    Hypothyroidism.  Taking levothyroxine as prescribed. Has noticed throat feels tender and a bit swollen. Denies dysphagia, abnormal fatigue, weight change, temperature intolerance, hair/skin/nail changes, bowel habit changes, anxiety, hoarseness.         Subjective      I have reviewed and the following portions of the patient's history were updated as appropriate: past family history, past medical history, past social history, past surgical history and problem list.      Current Outpatient Medications:     amphetamine-dextroamphetamine XR (ADDERALL XR) 20 MG 24 hr capsule, Take 1 capsule by mouth Daily, Disp: 30 capsule, Rfl: 0    B-D 3CC LUER-GAIL SYR 25GX5/8\" 25G X 5/8\" 3 ML misc, " "USE FOR SUBCUTANEOUS INJECTION OF MEDICATION WEEKLY AS DIRECTED, Disp: , Rfl:     BD Hypodermic Needle 18G X 1\" misc, USE TO DRAW UP MEDICATION FOR INJECTION WEEKLY, Disp: , Rfl:     buPROPion XL (WELLBUTRIN XL) 150 MG 24 hr tablet, TAKE 1 TABLET BY MOUTH EVERY DAY, Disp: 30 tablet, Rfl: 2    busPIRone (BUSPAR) 10 MG tablet, Take 1 tablet by mouth 2 (Two) Times a Day As Needed (anxiety)., Disp: 60 tablet, Rfl: 2    Calcium Carbonate 1500 (600 Ca) MG tablet, Take 1 tablet by mouth Daily., Disp: 90 tablet, Rfl: 1    estradiol (ESTRACE) 0.1 MG/GM vaginal cream, INSERT 1 GRAM VAGINALLY 2 TIMES EVERY WEEK, Disp: , Rfl:     FINASTERIDE-MINOXIDIL EX, , Disp: , Rfl:     hydrOXYzine (ATARAX) 10 MG tablet, Take 1 tablet by mouth At Night As Needed for Anxiety. for sleep, Disp: 30 tablet, Rfl: 2    levothyroxine (SYNTHROID, LEVOTHROID) 75 MCG tablet, Take 1 tablet by mouth Daily., Disp: 30 tablet, Rfl: 1    Testosterone Cypionate (DEPOTESTOTERONE CYPIONATE) 200 MG/ML injection, 1 mL 1 (One) Time Per Week. 0.25 ml, Disp: , Rfl:     vitamin D (ERGOCALCIFEROL) 1.25 MG (79039 UT) capsule capsule, TAKE 1 CAPSULE BY MOUTH 1 TIME PER WEEK., Disp: 12 capsule, Rfl: 1    Allergies   Allergen Reactions    Eucalyptus Oil Dermatitis, Itching, Rash and Shortness Of Breath       Objective     Physical Exam:  Vital Signs:   Vitals:    11/15/24 1531   BP: 130/88   Pulse: 86   Temp: 98.1 °F (36.7 °C)   SpO2: 98%   Weight: 115 kg (254 lb)   Height: 170.2 cm (67.01\")     Body mass index is 39.77 kg/m².         Physical Exam  Constitutional:       Appearance: He is obese. He is not ill-appearing.   HENT:      Head: Normocephalic.      Right Ear: External ear normal.      Left Ear: External ear normal.   Eyes:      Conjunctiva/sclera: Conjunctivae normal.      Pupils: Pupils are equal, round, and reactive to light.   Neck:      Thyroid: No thyroid mass, thyromegaly or thyroid tenderness.   Cardiovascular:      Rate and Rhythm: Normal rate and " regular rhythm.      Pulses:           Radial pulses are 2+ on the right side and 2+ on the left side.        Dorsalis pedis pulses are 2+ on the right side and 2+ on the left side.      Heart sounds: Normal heart sounds.   Pulmonary:      Effort: Pulmonary effort is normal.      Breath sounds: Normal breath sounds.   Musculoskeletal:      Cervical back: Normal range of motion and neck supple.      Right lower leg: No edema.      Left lower leg: No edema.   Lymphadenopathy:      Cervical: No cervical adenopathy.   Skin:     General: Skin is warm.      Capillary Refill: Capillary refill takes less than 2 seconds.   Neurological:      Mental Status: He is alert and oriented to person, place, and time.      Coordination: Coordination normal.      Gait: Gait normal.   Psychiatric:         Mood and Affect: Mood normal.         Behavior: Behavior normal.         Thought Content: Thought content normal.           ECG 12 Lead    Date/Time: 11/19/2024 7:43 AM  Performed by: Ana Markham APRN    Authorized by: Ana Markham APRN  Comparison: not compared with previous ECG   Previous ECG: no previous ECG available  Rhythm: sinus rhythm  Rate: normal  BPM: 64  Conduction: conduction normal    Clinical impression: normal ECG          Assessment / Plan      Assessment/Plan:   Diagnoses and all orders for this visit:    1. Tachycardia (Primary)  -     CBC & Differential  -     Magnesium  -     Basic Metabolic Panel  -     Iron and TIBC  -     Vitamin B12  -     Folate  -     ECG 12 Lead  - Stay well hydrated. Continue to monitor heart rate and rhythm.  Encouraged to contact clinic with any concerns.      2. History of anemia  -     CBC & Differential  -     Iron and TIBC  -     Vitamin B12  -     Folate    3. Neck swelling  -     US head neck soft tissue; Future             Follow Up:   Return if symptoms worsen or fail to improve.    Patient was given instructions and counseling regarding his condition or for  health maintenance advice. Please see specific information pulled into the AVS if appropriate.       [facility] [address] [facility]     Please note that portions of this note may have been completed with a voice recognition program. Efforts were made to edit dictation, but occasionally words are mistranscribed.

## 2024-11-16 ENCOUNTER — PATIENT MESSAGE (OUTPATIENT)
Dept: INTERNAL MEDICINE | Facility: CLINIC | Age: 24
End: 2024-11-16
Payer: COMMERCIAL

## 2024-11-16 DIAGNOSIS — L65.9 ALOPECIA: Primary | ICD-10-CM

## 2024-11-19 PROCEDURE — 93000 ELECTROCARDIOGRAM COMPLETE: CPT | Performed by: NURSE PRACTITIONER

## 2024-12-03 RX ORDER — MINOXIDIL 2.5 MG/1
2.5 TABLET ORAL DAILY
Qty: 30 TABLET | Refills: 0 | Status: SHIPPED | OUTPATIENT
Start: 2024-12-03

## 2024-12-04 ENCOUNTER — TELEMEDICINE (OUTPATIENT)
Dept: FAMILY MEDICINE CLINIC | Facility: TELEHEALTH | Age: 24
End: 2024-12-04
Payer: COMMERCIAL

## 2024-12-04 DIAGNOSIS — K52.9 GASTROENTERITIS: Primary | ICD-10-CM

## 2024-12-04 PROCEDURE — 99213 OFFICE O/P EST LOW 20 MIN: CPT | Performed by: NURSE PRACTITIONER

## 2024-12-04 RX ORDER — ONDANSETRON 4 MG/1
4 TABLET, ORALLY DISINTEGRATING ORAL EVERY 8 HOURS PRN
Qty: 15 TABLET | Refills: 0 | Status: SHIPPED | OUTPATIENT
Start: 2024-12-04 | End: 2024-12-09

## 2024-12-04 NOTE — PATIENT INSTRUCTIONS
Take Zofran and wait 30 minute then sips of clear liquids such as Gatorade for 12 hours then transition to a bland diet, such as a BRAT diet. If vomiting continues after 3 days, see your PCP or go to the ER

## 2024-12-04 NOTE — LETTER
[facility]  [facility]  [address]  [address]  [place] [address]  Phone: [phone]  Fax: [phone]    Carrie Ramirez was seen and treated in our Urgent Care on 12/4/2024.  He may return to work on 12/6/24      .        Thank you for choosing [facility].      RICKEY Cruz

## 2024-12-04 NOTE — PROGRESS NOTES
You have chosen to receive care through a telehealth visit.  Do you consent to use a video/audio connection for your medical care today? Yes     CHIEF COMPLAINT  Chief Complaint   Patient presents with    Vomiting    Diarrhea         HPI  Carrie Ramirez is a 24 y.o. adult  presents with complaint of 4 episodes of vomiting and 3 episodes of diarrhea since 0500 this am.    Review of Systems   Gastrointestinal:  Positive for diarrhea and vomiting.   All other systems reviewed and are negative.      History reviewed. No pertinent past medical history.    Family History   Problem Relation Age of Onset    Arthritis Mother     Hypertension Mother     Drug abuse Mother     Drug abuse Father          in house fire    Drug abuse Sister     ADD / ADHD Brother        Social History     Socioeconomic History    Marital status: Single   Tobacco Use    Smoking status: Never     Passive exposure: Never    Smokeless tobacco: Never   Vaping Use    Vaping status: Never Used   Substance and Sexual Activity    Alcohol use: Yes     Comment: occasionally / socially 1 or 2 drinks    Drug use: Never    Sexual activity: Defer       Carrie Ramirez  reports that he has never smoked. He has never been exposed to tobacco smoke. He has never used smokeless tobacco.           There were no vitals taken for this visit.    PHYSICAL EXAM  Physical Exam   Constitutional: He appears well-developed and well-nourished.   HENT:   Head: Normocephalic.   Eyes: Pupils are equal, round, and reactive to light.   Pulmonary/Chest: Effort normal.   Musculoskeletal: Normal range of motion.   Neurological: He is alert.   Psychiatric: He has a normal mood and affect.       Results for orders placed or performed in visit on 10/29/24   Hemoglobin & Hematocrit, Blood    Collection Time: 10/29/24  5:09 PM    Specimen: Blood   Result Value Ref Range    Hemoglobin 15.5 12.0 - 15.9 g/dL    Hematocrit 46.8 (H) 34.0 - 46.6 %       Diagnoses and all orders for this visit:    1.  Gastroenteritis (Primary)  -     ondansetron ODT (ZOFRAN-ODT) 4 MG disintegrating tablet; Place 1 tablet on the tongue Every 8 (Eight) Hours As Needed for Nausea or Vomiting for up to 5 days.  Dispense: 15 tablet; Refill: 0          FOLLOW-UP  As discussed during visit with PCP/[facility] if no improvement or Urgent Care/Emergency Department if worsening of symptoms    Patient verbalizes understanding of medication dosage, comfort measures, instructions for treatment and follow-up.    RICKEY Wilkerson  12/04/2024  06:40 EST    Mode of Visit: Video  Location of patient: -HOME-  Location of provider: +HOME+  You have chosen to receive care through a telehealth visit.  The patient has signed the video visit consent form.  The visit included audio and video interaction. No technical issues occurred during this visit.      Note Disclaimer: At [facility], we believe that sharing information builds trust and better   relationships. You are receiving this note because you recently visited [facility]. It is possible you   will see health information before a provider has talked with you about it. This kind of information can   be easy to misunderstand. To help you fully understand what it means for your health, we urge you to   discuss this note with your provider.

## 2024-12-28 DIAGNOSIS — F41.1 GAD (GENERALIZED ANXIETY DISORDER): Chronic | ICD-10-CM

## 2024-12-30 RX ORDER — BUSPIRONE HYDROCHLORIDE 10 MG/1
10 TABLET ORAL 2 TIMES DAILY PRN
Qty: 60 TABLET | Refills: 2 | Status: SHIPPED | OUTPATIENT
Start: 2024-12-30

## 2025-01-02 DIAGNOSIS — E03.9 HYPOTHYROIDISM, UNSPECIFIED TYPE: ICD-10-CM

## 2025-01-02 DIAGNOSIS — L65.9 ALOPECIA: ICD-10-CM

## 2025-01-02 RX ORDER — LEVOTHYROXINE SODIUM 75 UG/1
75 TABLET ORAL DAILY
Qty: 30 TABLET | Refills: 1 | Status: SHIPPED | OUTPATIENT
Start: 2025-01-02

## 2025-01-02 RX ORDER — MINOXIDIL 2.5 MG/1
2.5 TABLET ORAL DAILY
Qty: 30 TABLET | Refills: 0 | Status: SHIPPED | OUTPATIENT
Start: 2025-01-02

## 2025-02-04 DIAGNOSIS — L65.9 ALOPECIA: ICD-10-CM

## 2025-02-04 DIAGNOSIS — E03.9 HYPOTHYROIDISM, UNSPECIFIED TYPE: ICD-10-CM

## 2025-02-04 RX ORDER — MINOXIDIL 2.5 MG/1
2.5 TABLET ORAL DAILY
Qty: 90 TABLET | Refills: 1 | Status: SHIPPED | OUTPATIENT
Start: 2025-02-04

## 2025-02-04 RX ORDER — LEVOTHYROXINE SODIUM 75 UG/1
75 TABLET ORAL DAILY
Qty: 30 TABLET | Refills: 0 | Status: SHIPPED | OUTPATIENT
Start: 2025-02-04

## 2025-02-10 NOTE — PROGRESS NOTES
"Mode of Visit: Video   Location of patient: -HOME-   Location of provider: [facility]   You have chosen to receive care through a telehealth visit.   The patient has signed the video visit consent form.   The visit included audio and video interaction. No technical issues occurred during this visit.     Chief Complaint  Depression, anxiety, and ADHD combined type      Subjective          Carrie Ramirez presents via [employer] through Epic by himself for a follow up and medication check. The patient prefers pronouns he/him.     History of Present Illness: Carrie states, \"I have kind of been in the [facility].\"  Carrie tells me that he is in his final semester of graduate school.  He is in a practicum which has not started yet and is working on his thesis.  He is excited about his thesis project and finds this type of work provides motivation and satisfaction for him.  He reports feelings of grief and sadness because his adoptive mother has been placed in hospice care.  Her prognosis is poor and may only have days to live.  He has been traveling to [place] to see her and support his adoptive siblings.  He continues in his relationship with his partner who is student teaching at this time.  That has added some stress due to the recent spread of illnesses in the school system and his partner dealing with OCD symptoms.  He reports symptoms of depression like depressed mood, lack of motivation, feelings of guilt, and decreased appetite.  He denies changes in sleep, but reports having a concern for feeling wide awake one minute and an overwhelming need to sleep the next.  He has never seen anyone for a sleep study.  He is managing his hypothyroidism with levothyroxine.  He denies any problems with anxiety.  He reports good focus, concentration, and attention. He also reports not taking his Adderall XR regularly due to a decreased work load for school.  He is taking Buspar, Wellbutrin XL, Adderall XR, and Hydroxyzine.  He " "denies any SI/HI/AVH.    Current Medications:   Current Outpatient Medications   Medication Sig Dispense Refill    amphetamine-dextroamphetamine XR (ADDERALL XR) 20 MG 24 hr capsule Take 1 capsule by mouth Daily 30 capsule 0    B-D 3CC LUER-GAIL SYR 25GX5/8\" 25G X 5/8\" 3 ML misc USE FOR SUBCUTANEOUS INJECTION OF MEDICATION WEEKLY AS DIRECTED      BD Hypodermic Needle 18G X 1\" misc USE TO DRAW UP MEDICATION FOR INJECTION WEEKLY      buPROPion XL (WELLBUTRIN XL) 150 MG 24 hr tablet Take 1 tablet by mouth Daily. 30 tablet 2    busPIRone (BUSPAR) 10 MG tablet TAKE 1 TABLET BY MOUTH 2 (TWO) TIMES A DAY AS NEEDED (ANXIETY). 60 tablet 2    Calcium Carbonate 1500 (600 Ca) MG tablet Take 1 tablet by mouth Daily. 90 tablet 1    estradiol (ESTRACE) 0.1 MG/GM vaginal cream INSERT 1 GRAM VAGINALLY 2 TIMES EVERY WEEK      hydrOXYzine (ATARAX) 10 MG tablet Take 1 tablet by mouth At Night As Needed for Anxiety. for sleep 30 tablet 2    ibuprofen (ADVIL,MOTRIN) 600 MG tablet Take 1 tablet by mouth Every 6 (Six) Hours As Needed for Mild Pain.      levothyroxine (SYNTHROID, LEVOTHROID) 75 MCG tablet Take 1 tablet by mouth Daily. 30 tablet 0    lidocaine (LIDODERM) 5 % Please see attached for detailed directions      minoxidil (LONITEN) 2.5 MG tablet Take 1 tablet by mouth Daily. 90 tablet 1    Testosterone Cypionate (DEPOTESTOTERONE CYPIONATE) 200 MG/ML injection 1 mL 1 (One) Time Per Week. 0.25 ml      vitamin D (ERGOCALCIFEROL) 1.25 MG (02668 UT) capsule capsule TAKE 1 CAPSULE BY MOUTH 1 TIME PER WEEK. 12 capsule 1    citalopram (CeleXA) 10 MG tablet Take 1 tablet by mouth Daily. 30 tablet 2     No current facility-administered medications for this visit.         Objective   Vital Signs:   There were no vitals taken for this visit.    Physical Exam  Nursing note reviewed. Vitals reviewed: No vitals due to nature of telehealth visit.  Constitutional:       Appearance: Normal appearance. He is obese.   Neurological:      General: No " focal deficit present.      Mental Status: He is alert and oriented to person, place, and time.        The following data was reviewed by: RICKEY Martinez on 02/12/2025:    Telemedicine with Ale Diaz APRN (12/04/2024)   Office Visit with Ana Markham APRN (11/15/2024)     Assessment and Plan    Diagnoses and all orders for this visit:    1. Moderate recurrent major depression (Primary)  -     buPROPion XL (WELLBUTRIN XL) 150 MG 24 hr tablet; Take 1 tablet by mouth Daily.  Dispense: 30 tablet; Refill: 2  -     citalopram (CeleXA) 10 MG tablet; Take 1 tablet by mouth Daily.  Dispense: 30 tablet; Refill: 2    2. ADHD (attention deficit hyperactivity disorder), combined type  -     amphetamine-dextroamphetamine XR (ADDERALL XR) 20 MG 24 hr capsule; Take 1 capsule by mouth Daily  Dispense: 30 capsule; Refill: 0    3. ABELARDO (generalized anxiety disorder)          MENTAL STATUS EXAM   General Appearance:  Cleanly groomed and dressed and well developed  Eye Contact:  Fair  Attitude:  Cooperative  Motor Activity:  Normal gait, posture  Muscle Strength:  Normal  Speech:  Rapid  Language:  Spontaneous  Mood and affect:  Anxious and depressed  Hopelessness:  5  Loneliness: Denies  Thought Process:  Logical, derailments and disorganized  Associations/ Thought Content:  No delusions  Hallucinations:  None  Suicidal Ideations:  Not present  Homicidal Ideation:  Not present  Sensorium:  Alert and clear  Orientation:  Person, place, time and situation  Immediate Recall, Recent, and Remote Memory:  Intact  Attention Span/ Concentration:  Easily distracted  Fund of Knowledge:  Appropriate for age and educational level  Intellectual Functioning:  Above average  Insight:  Good  Judgement:  Good  Reliability:  Good  Impulse Control:  Good       PHQ-9 Score:   PHQ-9 Total Score: (Patient-Rptd) 12      ABELARDO-7 Score:  ABELARDO 7 Total Score: (Patient-Rptd) 14    Impression/Plan:  -This is a follow up and  medication check. Carrie reports an exacerbation of depression.  There are stressors and grief he is experiencing at this time.  He reports good focus, concentration, and attention with his stimulant, but admits he has not been taking on a regular basis due to a decreased workload for graduate school.  He notes the medication is beneficial and may help him stay focused during conversations if he takes more regularly.  He is easily distracted and has difficulty staying on topic during the conversation.  He denies any problems with anxiety, but does have some worry about his significant other who has been exposed to illnesses and deals with OCD.  Also, his adoptive mother has been placed in hospice care.  He has plans to resume therapy upon completion of his graduate program.  He also reports having concerns for being wide-awake one minute and falling asleep easily the next.  He has never been tested for sleep apnea or any other sleep impairment like narcolepsy.  I suggested we refer to neurology for a sleep evaluation and he agrees.  He is also open to medication adjustments to improve symptoms of depression.  We discussed increasing Wellbutrin versus adding a serotonergic agent to augment.  He is pleased with his current Wellbutrin dose and is hesitant to make adjustments.  I suggested augmenting with Celexa.  I explained the mechanism of action, purpose, risk, benefits, and potential adverse effects like sexual side effects and weight gain to name a few.  The potential for weight gain does concern him, but he feels the benefits of improved mood may outweigh the risk of weight gain.  -Initiate Celexa 10 mg daily for depression  -Continue Buspar 10 mg two times daily as needed for anxiety.  Patient has refills.  -Continue Adderall XR 20 mg daily for ADHD.   -Continue Wellbutrin  mg daily for depression. Patient has refills.  -Continue Hydroxyzine 10 mg nightly for anxiety. Patient has refills.   -Consider  counseling.  -Patient has an appointment to monitor thyroid  -The JACQUELINE report, reviewed through PDMP, of the past 12 months were reviewed and is appropriate.  The patient/guardian reports taking the medication only as prescribed.  The patient/guardian denies any abuse or misuse of the medication.  The patient/guardian denies any other substance use or issues.  There are no apparent substance related issues.  The patient reports no side effects of the current medication usage.  The patient/guardian has reported significant improvement with medication usage and wishes to continue medication as prescribed.  The patient/guardian is appropriate to continue with current medication usage at this time.  Reinforced risks and side effects of medication usage, patient and/or guardian verbalize understanding in their own words and are in agreement with current plan.   -Last UDS on 10/25/24.  Negative for all substances.  -Consult neurology for sleep study    MEDS ORDERED DURING VISIT:  New Medications Ordered This Visit   Medications    buPROPion XL (WELLBUTRIN XL) 150 MG 24 hr tablet     Sig: Take 1 tablet by mouth Daily.     Dispense:  30 tablet     Refill:  2    amphetamine-dextroamphetamine XR (ADDERALL XR) 20 MG 24 hr capsule     Sig: Take 1 capsule by mouth Daily     Dispense:  30 capsule     Refill:  0    citalopram (CeleXA) 10 MG tablet     Sig: Take 1 tablet by mouth Daily.     Dispense:  30 tablet     Refill:  2         Follow Up   Return in about 4 weeks (around 3/12/2025) for Medication Check.  Patient was given instructions and counseling regarding his condition or for health maintenance advice. Please see specific information pulled into the AVS if appropriate.       TREATMENT PLAN/GOALS: Continue supportive psychotherapy efforts and medications as indicated. Treatment and medication options discussed during today's visit. Patient acknowledged and verbally consented to continue with current treatment plan and  was educated on the importance of compliance with treatment and follow-up appointments.    MEDICATION ISSUES:  Discussed medication options and treatment plan of prescribed medication as well as the risks, benefits, and side effects including potential falls, possible impaired driving and metabolic adversities among others. Patient is agreeable to call the office with any worsening of symptoms or onset of side effects. Patient is agreeable to call 911 or go to the nearest ER should he/she begin having SI/HI.        This document has been electronically signed by RICKEY Duarte, PMHNP-BC  February 12, 2025 17:13 EST    Part of this note may be an electronic transcription/translation of spoken language to printed text using the Dragon Dictation System.

## 2025-02-12 ENCOUNTER — TELEMEDICINE (OUTPATIENT)
Dept: PSYCHIATRY | Facility: CLINIC | Age: 25
End: 2025-02-12
Payer: COMMERCIAL

## 2025-02-12 DIAGNOSIS — F33.1 MODERATE RECURRENT MAJOR DEPRESSION: Primary | Chronic | ICD-10-CM

## 2025-02-12 DIAGNOSIS — F90.2 ADHD (ATTENTION DEFICIT HYPERACTIVITY DISORDER), COMBINED TYPE: Chronic | ICD-10-CM

## 2025-02-12 DIAGNOSIS — F41.1 GAD (GENERALIZED ANXIETY DISORDER): Chronic | ICD-10-CM

## 2025-02-12 PROCEDURE — 99214 OFFICE O/P EST MOD 30 MIN: CPT | Performed by: NURSE PRACTITIONER

## 2025-02-12 PROCEDURE — 1160F RVW MEDS BY RX/DR IN RCRD: CPT | Performed by: NURSE PRACTITIONER

## 2025-02-12 PROCEDURE — 1159F MED LIST DOCD IN RCRD: CPT | Performed by: NURSE PRACTITIONER

## 2025-02-12 RX ORDER — BUPROPION HYDROCHLORIDE 150 MG/1
150 TABLET ORAL DAILY
Qty: 30 TABLET | Refills: 2 | Status: SHIPPED | OUTPATIENT
Start: 2025-02-12

## 2025-02-12 RX ORDER — IBUPROFEN 600 MG/1
600 TABLET, FILM COATED ORAL EVERY 6 HOURS PRN
COMMUNITY
Start: 2024-12-19

## 2025-02-12 RX ORDER — CITALOPRAM HYDROBROMIDE 10 MG/1
10 TABLET ORAL DAILY
Qty: 30 TABLET | Refills: 2 | Status: SHIPPED | OUTPATIENT
Start: 2025-02-12

## 2025-02-12 RX ORDER — DEXTROAMPHETAMINE SACCHARATE, AMPHETAMINE ASPARTATE MONOHYDRATE, DEXTROAMPHETAMINE SULFATE AND AMPHETAMINE SULFATE 5; 5; 5; 5 MG/1; MG/1; MG/1; MG/1
20 CAPSULE, EXTENDED RELEASE ORAL DAILY
Qty: 30 CAPSULE | Refills: 0 | Status: SHIPPED | OUTPATIENT
Start: 2025-02-12 | End: 2026-02-12

## 2025-02-12 RX ORDER — LIDOCAINE 50 MG/G
PATCH TOPICAL
COMMUNITY
Start: 2025-01-28

## 2025-02-27 ENCOUNTER — PATIENT MESSAGE (OUTPATIENT)
Dept: INTERNAL MEDICINE | Facility: CLINIC | Age: 25
End: 2025-02-27
Payer: COMMERCIAL

## 2025-03-01 ENCOUNTER — LAB (OUTPATIENT)
Dept: LAB | Facility: HOSPITAL | Age: 25
End: 2025-03-01
Payer: COMMERCIAL

## 2025-03-01 LAB
ANION GAP SERPL CALCULATED.3IONS-SCNC: 11.4 MMOL/L (ref 5–15)
BASOPHILS # BLD AUTO: 0.05 10*3/MM3 (ref 0–0.2)
BASOPHILS NFR BLD AUTO: 0.7 % (ref 0–1.5)
BUN SERPL-MCNC: 12 MG/DL (ref 6–20)
BUN/CREAT SERPL: 11.9 (ref 7–25)
CALCIUM SPEC-SCNC: 9.1 MG/DL (ref 8.6–10.5)
CHLORIDE SERPL-SCNC: 105 MMOL/L (ref 98–107)
CO2 SERPL-SCNC: 24.6 MMOL/L (ref 22–29)
CREAT SERPL-MCNC: 1.01 MG/DL (ref 0.57–1)
DEPRECATED RDW RBC AUTO: 37.9 FL (ref 37–54)
EGFRCR SERPLBLD CKD-EPI 2021: 80.4 ML/MIN/1.73
EOSINOPHIL # BLD AUTO: 0.13 10*3/MM3 (ref 0–0.4)
EOSINOPHIL NFR BLD AUTO: 1.7 % (ref 0.3–6.2)
ERYTHROCYTE [DISTWIDTH] IN BLOOD BY AUTOMATED COUNT: 12.7 % (ref 12.3–15.4)
FOLATE SERPL-MCNC: 8.85 NG/ML (ref 4.78–24.2)
GLUCOSE SERPL-MCNC: 98 MG/DL (ref 65–99)
HCT VFR BLD AUTO: 47.1 % (ref 34–46.6)
HGB BLD-MCNC: 15.1 G/DL (ref 12–15.9)
IMM GRANULOCYTES # BLD AUTO: 0.02 10*3/MM3 (ref 0–0.05)
IMM GRANULOCYTES NFR BLD AUTO: 0.3 % (ref 0–0.5)
IRON 24H UR-MRATE: 93 MCG/DL (ref 37–145)
IRON SATN MFR SERPL: 27 % (ref 20–50)
LYMPHOCYTES # BLD AUTO: 2.53 10*3/MM3 (ref 0.7–3.1)
LYMPHOCYTES NFR BLD AUTO: 33.3 % (ref 19.6–45.3)
MAGNESIUM SERPL-MCNC: 2.1 MG/DL (ref 1.6–2.6)
MCH RBC QN AUTO: 26.2 PG (ref 26.6–33)
MCHC RBC AUTO-ENTMCNC: 32.1 G/DL (ref 31.5–35.7)
MCV RBC AUTO: 81.6 FL (ref 79–97)
MONOCYTES # BLD AUTO: 0.68 10*3/MM3 (ref 0.1–0.9)
MONOCYTES NFR BLD AUTO: 9 % (ref 5–12)
NEUTROPHILS NFR BLD AUTO: 4.18 10*3/MM3 (ref 1.7–7)
NEUTROPHILS NFR BLD AUTO: 55 % (ref 42.7–76)
NRBC BLD AUTO-RTO: 0 /100 WBC (ref 0–0.2)
PLATELET # BLD AUTO: 262 10*3/MM3 (ref 140–450)
PMV BLD AUTO: 10.4 FL (ref 6–12)
POTASSIUM SERPL-SCNC: 4.1 MMOL/L (ref 3.5–5.2)
RBC # BLD AUTO: 5.77 10*6/MM3 (ref 3.77–5.28)
SODIUM SERPL-SCNC: 141 MMOL/L (ref 136–145)
T4 FREE SERPL-MCNC: 1.34 NG/DL (ref 0.92–1.68)
TIBC SERPL-MCNC: 343 MCG/DL (ref 298–536)
TRANSFERRIN SERPL-MCNC: 230 MG/DL (ref 200–360)
TSH SERPL DL<=0.05 MIU/L-ACNC: 3.99 UIU/ML (ref 0.27–4.2)
VIT B12 BLD-MCNC: 598 PG/ML (ref 211–946)
WBC NRBC COR # BLD AUTO: 7.59 10*3/MM3 (ref 3.4–10.8)

## 2025-03-01 PROCEDURE — 83540 ASSAY OF IRON: CPT | Performed by: NURSE PRACTITIONER

## 2025-03-01 PROCEDURE — 80048 BASIC METABOLIC PNL TOTAL CA: CPT | Performed by: NURSE PRACTITIONER

## 2025-03-01 PROCEDURE — 82607 VITAMIN B-12: CPT | Performed by: NURSE PRACTITIONER

## 2025-03-01 PROCEDURE — 84466 ASSAY OF TRANSFERRIN: CPT | Performed by: NURSE PRACTITIONER

## 2025-03-01 PROCEDURE — 85025 COMPLETE CBC W/AUTO DIFF WBC: CPT | Performed by: NURSE PRACTITIONER

## 2025-03-01 PROCEDURE — 83735 ASSAY OF MAGNESIUM: CPT | Performed by: NURSE PRACTITIONER

## 2025-03-01 PROCEDURE — 82746 ASSAY OF FOLIC ACID SERUM: CPT | Performed by: NURSE PRACTITIONER

## 2025-03-03 DIAGNOSIS — E03.9 HYPOTHYROIDISM, UNSPECIFIED TYPE: ICD-10-CM

## 2025-03-03 RX ORDER — LEVOTHYROXINE SODIUM 75 UG/1
75 TABLET ORAL DAILY
Qty: 90 TABLET | Refills: 1 | Status: SHIPPED | OUTPATIENT
Start: 2025-03-03

## 2025-03-24 DIAGNOSIS — E55.9 VITAMIN D DEFICIENCY: ICD-10-CM

## 2025-03-24 DIAGNOSIS — F90.2 ADHD (ATTENTION DEFICIT HYPERACTIVITY DISORDER), COMBINED TYPE: Chronic | ICD-10-CM

## 2025-03-24 RX ORDER — ERGOCALCIFEROL 1.25 MG/1
50000 CAPSULE, LIQUID FILLED ORAL WEEKLY
Qty: 12 CAPSULE | Refills: 1 | Status: SHIPPED | OUTPATIENT
Start: 2025-03-24

## 2025-03-24 RX ORDER — DEXTROAMPHETAMINE SACCHARATE, AMPHETAMINE ASPARTATE MONOHYDRATE, DEXTROAMPHETAMINE SULFATE AND AMPHETAMINE SULFATE 5; 5; 5; 5 MG/1; MG/1; MG/1; MG/1
20 CAPSULE, EXTENDED RELEASE ORAL DAILY
Qty: 30 CAPSULE | Refills: 0 | Status: SHIPPED | OUTPATIENT
Start: 2025-03-24 | End: 2026-03-24

## 2025-03-24 NOTE — TELEPHONE ENCOUNTER
Rx Refill Note  Requested Prescriptions     Pending Prescriptions Disp Refills    amphetamine-dextroamphetamine XR (ADDERALL XR) 20 MG 24 hr capsule 30 capsule 0     Sig: Take 1 capsule by mouth Daily      Last office visit with prescribing clinician: 8/1/2023   Last telemedicine visit with prescribing clinician: 2/12/2025   Next office visit with prescribing clinician: 4/7/2025                         Would you like a call back once the refill request has been completed: [] Yes [] No    If the office needs to give you a call back, can they leave a voicemail: [] Yes [] No    Ervin Rincon MA  03/24/25, 14:24 EDT
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
## 2025-04-07 ENCOUNTER — TELEMEDICINE (OUTPATIENT)
Dept: PSYCHIATRY | Facility: CLINIC | Age: 25
End: 2025-04-07
Payer: COMMERCIAL

## 2025-04-07 DIAGNOSIS — F33.1 MODERATE RECURRENT MAJOR DEPRESSION: Primary | Chronic | ICD-10-CM

## 2025-04-07 DIAGNOSIS — F90.2 ADHD (ATTENTION DEFICIT HYPERACTIVITY DISORDER), COMBINED TYPE: Chronic | ICD-10-CM

## 2025-04-07 DIAGNOSIS — F41.1 GAD (GENERALIZED ANXIETY DISORDER): Chronic | ICD-10-CM

## 2025-04-07 PROCEDURE — 1159F MED LIST DOCD IN RCRD: CPT | Performed by: NURSE PRACTITIONER

## 2025-04-07 PROCEDURE — 1160F RVW MEDS BY RX/DR IN RCRD: CPT | Performed by: NURSE PRACTITIONER

## 2025-04-07 PROCEDURE — 99214 OFFICE O/P EST MOD 30 MIN: CPT | Performed by: NURSE PRACTITIONER

## 2025-04-07 RX ORDER — NEEDLES, SAFETY 22GX1 1/2"
NEEDLE, DISPOSABLE MISCELLANEOUS
COMMUNITY
Start: 2025-03-31

## 2025-04-07 RX ORDER — CITALOPRAM HYDROBROMIDE 20 MG/1
20 TABLET ORAL DAILY
Qty: 30 TABLET | Refills: 2 | Status: SHIPPED | OUTPATIENT
Start: 2025-04-07

## 2025-05-02 DIAGNOSIS — F90.2 ADHD (ATTENTION DEFICIT HYPERACTIVITY DISORDER), COMBINED TYPE: Chronic | ICD-10-CM

## 2025-05-02 RX ORDER — DEXTROAMPHETAMINE SACCHARATE, AMPHETAMINE ASPARTATE MONOHYDRATE, DEXTROAMPHETAMINE SULFATE AND AMPHETAMINE SULFATE 5; 5; 5; 5 MG/1; MG/1; MG/1; MG/1
20 CAPSULE, EXTENDED RELEASE ORAL DAILY
Qty: 30 CAPSULE | Refills: 0 | Status: SHIPPED | OUTPATIENT
Start: 2025-05-02 | End: 2026-05-02

## 2025-05-02 NOTE — TELEPHONE ENCOUNTER
Rx Refill Note  Requested Prescriptions     Pending Prescriptions Disp Refills    amphetamine-dextroamphetamine XR (ADDERALL XR) 20 MG 24 hr capsule 30 capsule 0     Sig: Take 1 capsule by mouth Daily      Last office visit with prescribing clinician: Visit date not found   Last telemedicine visit with prescribing clinician: 4/7/2025   Next office visit with prescribing clinician: 6/9/2025                         Would you like a call back once the refill request has been completed: [] Yes [] No    If the office needs to give you a call back, can they leave a voicemail: [] Yes [] No    Hina Jalloh MA  05/02/25, 08:18 EDT

## 2025-05-05 ENCOUNTER — PATIENT MESSAGE (OUTPATIENT)
Dept: INTERNAL MEDICINE | Facility: CLINIC | Age: 25
End: 2025-05-05
Payer: COMMERCIAL

## 2025-06-09 ENCOUNTER — OFFICE VISIT (OUTPATIENT)
Dept: PSYCHIATRY | Facility: CLINIC | Age: 25
End: 2025-06-09
Payer: COMMERCIAL

## 2025-06-09 VITALS
BODY MASS INDEX: 40.62 KG/M2 | HEIGHT: 67 IN | SYSTOLIC BLOOD PRESSURE: 132 MMHG | WEIGHT: 258.8 LBS | OXYGEN SATURATION: 97 % | HEART RATE: 107 BPM | DIASTOLIC BLOOD PRESSURE: 82 MMHG

## 2025-06-09 DIAGNOSIS — F33.1 MODERATE RECURRENT MAJOR DEPRESSION: Chronic | ICD-10-CM

## 2025-06-09 DIAGNOSIS — F41.1 GAD (GENERALIZED ANXIETY DISORDER): ICD-10-CM

## 2025-06-09 DIAGNOSIS — F90.2 ADHD (ATTENTION DEFICIT HYPERACTIVITY DISORDER), COMBINED TYPE: Primary | Chronic | ICD-10-CM

## 2025-06-09 RX ORDER — CITALOPRAM HYDROBROMIDE 20 MG/1
20 TABLET ORAL DAILY
Qty: 30 TABLET | Refills: 2 | Status: SHIPPED | OUTPATIENT
Start: 2025-06-09

## 2025-06-09 RX ORDER — BUPROPION HYDROCHLORIDE 150 MG/1
150 TABLET ORAL DAILY
Qty: 30 TABLET | Refills: 2 | Status: SHIPPED | OUTPATIENT
Start: 2025-06-09

## 2025-06-09 RX ORDER — PROPRANOLOL HYDROCHLORIDE 10 MG/1
10 TABLET ORAL 2 TIMES DAILY PRN
Qty: 60 TABLET | Refills: 2 | Status: SHIPPED | OUTPATIENT
Start: 2025-06-09

## 2025-06-09 RX ORDER — BIOTIN 1000 MCG
TABLET,CHEWABLE ORAL
COMMUNITY

## 2025-06-09 RX ORDER — DEXTROAMPHETAMINE SACCHARATE, AMPHETAMINE ASPARTATE MONOHYDRATE, DEXTROAMPHETAMINE SULFATE AND AMPHETAMINE SULFATE 5; 5; 5; 5 MG/1; MG/1; MG/1; MG/1
20 CAPSULE, EXTENDED RELEASE ORAL DAILY
Qty: 30 CAPSULE | Refills: 0 | Status: SHIPPED | OUTPATIENT
Start: 2025-06-09 | End: 2025-07-09

## 2025-06-09 NOTE — PROGRESS NOTES
Follow Up Office Visit      Patient Name: Carrie Ramirez  : 2000   MRN: [id number]     Referring Provider: Ana Markham APRN    Chief Complaint:      ICD-10-CM ICD-9-CM   1. ADHD (attention deficit hyperactivity disorder), combined type  F90.2 314.01   2. ABELARDO (generalized anxiety disorder)  F41.1 300.02   3. Moderate recurrent major depression  F33.1 296.32      History of Present Illness  Carrie Ramirez is a 25 y.o. adult who is here today for follow up with medication symptom management.  Patient is a transgendered female to male.  He prefers pronouns he/him.  He was a patient of RICKEY Flores but then due to a change in her schedule he was transferred to this provider.  This is the first time this provider has saw this patient.  He is currently diagnosed with ADHD, combined type, generalized anxiety disorder, and moderate recurrent major depression.  He is currently being treated with Adderall XR 20 mg daily, Wellbutrin  mg daily, BuSpar 10 mg 2 times a day as needed for anxiety, and Celexa 20 mg tablet daily.    He reports a satisfactory response to his current medication regimen, which includes Adderall. However, he has exhausted his supply of Adderall today and is experiencing excessive sleepiness. He also notes an elevated heart rate, which he attributes to caffeine intake and significant anxiety. His appetite is diminished, but he continues to eat. His sleep pattern is generally consistent, retiring between 11:00 PM and 12:00 AM and waking between 8:00 AM and 9:00 AM. However, he struggles with morning wakefulness due to anxiety and ADHD. It takes him approximately 30 minutes to an hour to fall asleep, depending on his fatigue level. He has not yet undergone a sleep study or scheduled one. He has been referred for a sleep study. He has expressed interest in therapy and is considering telehealth sessions for convenience. He reports no thoughts of self-harm.    He is  currently experiencing heightened anxiety due to the stress of relocating. He is a  with two remaining courses and is also seeking employment as a  in the health sciences field. He has experienced a few anxiety attacks, which primarily manifest as an increased heart rate and difficulty focusing.    He is interested in being screened for bipolar disorder due to a family history of the condition in his biological mother. He occasionally experiences mood swings and impulsivity but does not engage in high-risk behaviors. He has periods of productivity followed by burnout, which have remained consistent on his ADHD medication. He reports more normal days since starting the medication. He has a history of PTSD and has previously found therapy beneficial.    He takes Synthroid upon waking and has noticed an improvement in his fatigue since starting this medication. He also takes vitamin D and calcium supplements.    After we discussed medication and symptom management, patient will be referred to psychotherapy within our clinic.  Patient will also continue on his Adderall XR 20 mg capsule daily, his Wellbutrin  mg daily, his BuSpar 10 mg twice daily as needed for anxiety, Celexa 20 mg daily, and will be prescribed on today's visit - propranolol 10 mg twice daily as needed for anxiety.  Patient was instructed on the mechanism of action and side effects of these medications and when to seek medical treatment.  Patient was instructed with any new or worsening symptoms to notify this provider.  Patient was instructed with any thoughts of self-harm or suicidal ideation with intent or plan to go directly to the emergency room.  Patient was instructed on adequate nutrition and hydration as well as staying active to help improve his overall mental and physical wellbeing.  Patient verbalized understanding to all.  Patient denies any recent or current SI/HI/AVH.  Patient will follow up with this  provider in 3 months or sooner if needed for medication symptom management.  A new controlled substance agreement will be obtained on today's visit.    Social History:  -  with two remaining courses  - Seeking employment as a   - Recently relocated    Psychiatric History:   - History of ADHD, anxiety, depression, and PTSD  - Currently on Wellbutrin, BuSpar, and Celexa  - Previous positive experiences with therapy    Substance Use:   - Reports a family history of marijuana use disorder in biological mother    Pertinent Negatives:   - Reports no thoughts of self-harm  - Does not engage in high-risk behaviors    FAMILY HISTORY  His biological mother was diagnosed with bipolar II around the patient's age and had substance abuse problems, including marijuana use.      Subjective     Review of Systems:   Review of Systems   Constitutional:  Negative for chills, diaphoresis, fatigue and fever.   HENT:  Negative for congestion, sore throat and swollen glands.    Respiratory:  Negative for cough.    Cardiovascular:  Negative for chest pain.   Gastrointestinal:  Negative for abdominal pain, nausea and vomiting.   Genitourinary:  Negative for dysuria.   Musculoskeletal:  Negative for myalgias and neck pain.   Skin:  Negative for rash.   Neurological:  Negative for weakness and numbness.   Psychiatric/Behavioral:  Positive for decreased concentration, sleep disturbance, positive for hyperactivity, depressed mood and stress. The patient is nervous/anxious.        Screening Scores:   PHQ-9 Total Score: 12    ABELARDO-7  Feeling nervous, anxious or on edge: Nearly every day  Not being able to stop or control worrying: More than half the days  Worrying too much about different things: More than half the days  Trouble Relaxing: Nearly every day  Being so restless that it is hard to sit still: More than half the days  Feeling afraid as if something awful might happen: Several days  Becoming easily annoyed  "or irritable: Not at all  ABELARDO 7 Total Score: 13  If you checked any problems, how difficult have these problems made it for you to do your work, take care of things at home, or get along with other people: Very difficult    RISK ASSESSMENT:  Patient denies any high risk factors today.    Medications:     Current Outpatient Medications:     amphetamine-dextroamphetamine XR (ADDERALL XR) 20 MG 24 hr capsule, Take 1 capsule by mouth Daily for 30 days, Disp: 30 capsule, Rfl: 0    B-D 3CC LUER-GAIL SYR 25GX5/8\" 25G X 5/8\" 3 ML misc, USE FOR SUBCUTANEOUS INJECTION OF MEDICATION WEEKLY AS DIRECTED, Disp: , Rfl:     B-D TB SYRINGE 1CC/27GX1/2\" 27G X 1/2\" 1 ML misc, USE FOR DRAWING UP AND INJECTING MEDICATION, Disp: , Rfl:     BD Hypodermic Needle 18G X 1\" misc, USE TO DRAW UP MEDICATION FOR INJECTION WEEKLY, Disp: , Rfl:     Biotin 1000 MCG chewable tablet, Chew., Disp: , Rfl:     buPROPion XL (WELLBUTRIN XL) 150 MG 24 hr tablet, Take 1 tablet by mouth Daily., Disp: 30 tablet, Rfl: 2    busPIRone (BUSPAR) 10 MG tablet, TAKE 1 TABLET BY MOUTH 2 (TWO) TIMES A DAY AS NEEDED (ANXIETY)., Disp: 60 tablet, Rfl: 2    Calcium Carbonate 1500 (600 Ca) MG tablet, Take 1 tablet by mouth Daily., Disp: 90 tablet, Rfl: 1    citalopram (CeleXA) 20 MG tablet, Take 1 tablet by mouth Daily., Disp: 30 tablet, Rfl: 2    estradiol (ESTRACE) 0.1 MG/GM vaginal cream, INSERT 1 GRAM VAGINALLY 2 TIMES EVERY WEEK, Disp: , Rfl:     levothyroxine (SYNTHROID, LEVOTHROID) 75 MCG tablet, Take 1 tablet by mouth Daily., Disp: 90 tablet, Rfl: 1    minoxidil (LONITEN) 2.5 MG tablet, Take 1 tablet by mouth Daily., Disp: 90 tablet, Rfl: 1    Testosterone Cypionate (DEPOTESTOTERONE CYPIONATE) 200 MG/ML injection, 1 mL 1 (One) Time Per Week. 0.25 ml (Patient taking differently: 1 mL 1 (One) Time Per Week. 0.45 ml), Disp: , Rfl:     vitamin D (ERGOCALCIFEROL) 1.25 MG (43497 UT) capsule capsule, TAKE 1 CAPSULE BY MOUTH 1 TIME PER WEEK., Disp: 12 capsule, Rfl: 1    " "ibuprofen (ADVIL,MOTRIN) 600 MG tablet, Take 1 tablet by mouth Every 6 (Six) Hours As Needed for Mild Pain., Disp: , Rfl:     propranolol (INDERAL) 10 MG tablet, Take 1 tablet by mouth 2 (Two) Times a Day As Needed (anxiety)., Disp: 60 tablet, Rfl: 2    Medication Considerations:  JACQUELINE reviewed and appropriate.      Allergies:   Allergies   Allergen Reactions    Eucalyptus Oil Dermatitis, Itching, Rash and Shortness Of Breath         Objective     Physical Exam:  Vital Signs:   Vitals:    06/09/25 1449   BP: 132/82   Pulse: 107   SpO2: 97%   Weight: 117 kg (258 lb 12.8 oz)   Height: 170.2 cm (67\")     Body mass index is 40.53 kg/m².     Mental Status Exam:   MENTAL STATUS EXAM   General Appearance:  Cleanly groomed and dressed  Eye Contact:  Good eye contact  Attitude:  Cooperative  Motor Activity:  Fidgety  Muscle Strength:  Normal  Speech:  Hyper talkative  Language:  Spontaneous  Mood and affect:  Anxious  Hopelessness:  Denies  Loneliness: Denies  Thought Process:  Logical  Associations/ Thought Content:  No delusions  Hallucinations:  None  Suicidal Ideations:  Not present  Homicidal Ideation:  Not present  Sensorium:  Alert  Orientation:  Person, time, situation and place  Immediate Recall, Recent, and Remote Memory:  Intact  Attention Span/ Concentration:  Easily distracted  Fund of Knowledge:  Appropriate for age and educational level  Intellectual Functioning:  Average range  Insight:  Good  Judgement:  Good  Reliability:  Good  Impulse Control:  Fair         Assessment / Plan      Visit Diagnosis/Orders Placed This Visit:  Diagnoses and all orders for this visit:    1. ADHD (attention deficit hyperactivity disorder), combined type (Primary)  -     amphetamine-dextroamphetamine XR (ADDERALL XR) 20 MG 24 hr capsule; Take 1 capsule by mouth Daily for 30 days  Dispense: 30 capsule; Refill: 0    2. ABELARDO (generalized anxiety disorder)  -     propranolol (INDERAL) 10 MG tablet; Take 1 tablet by mouth 2 (Two) " Times a Day As Needed (anxiety).  Dispense: 60 tablet; Refill: 2    3. Moderate recurrent major depression  -     buPROPion XL (WELLBUTRIN XL) 150 MG 24 hr tablet; Take 1 tablet by mouth Daily.  Dispense: 30 tablet; Refill: 2  -     citalopram (CeleXA) 20 MG tablet; Take 1 tablet by mouth Daily.  Dispense: 30 tablet; Refill: 2       Assessment & Plan  Problems:  - Attention Deficit Hyperactivity Disorder (ADHD)  - Anxiety  - Depression  - Sleep issues  - Family history of bipolar disorder    Content of Therapy:  During the session, the patient discussed his current medications and their effectiveness. He expressed increased anxiety due to moving and life changes. The patient also shared his concerns about potential bipolar disorder, given his family history. The discussion included exploring feelings of anxiety and depression, reframing thoughts about his current stressors, and addressing the importance of therapy for mood management.    Clinical Impression:  The patient's ADHD appears to be well-managed with Adderall, though he has run out of his medication today. His anxiety has increased due to recent life changes, and he is using BuSpar as needed. Depression symptoms are reported to be well-controlled with Wellbutrin and Celexa. The patient has not yet scheduled a sleep study despite a previous referral. He expressed concerns about potential bipolar disorder due to his biological mother's diagnosis, and therapy was recommended to further evaluate and manage any mood fluctuations.    Therapeutic Intervention:  - Prescription of a low dose of propranolol to manage anxiety-induced tachycardia, with instructions to take it twice daily as needed.  - Discussion on the importance of limiting caffeine intake and maintaining hydration.  - Encouragement to keep a mood diary to monitor mood fluctuations.  - Recommendation for therapy to help evaluate and manage potential bipolar disorder.    Plan:  - Continue Adderall as  prescribed; refill provided.  - Sign new controlled substance agreement.  - Undergo drug screen before 10/25/2025.  - Continue BuSpar as needed for anxiety, has refills  - Start propranolol for anxiety-induced tachycardia., sent prescription  - Continue Wellbutrin and Celexa as prescribed; refills provided.  - Schedule sleep study; provider to follow up on referral.  - Keep a mood diary to monitor mood fluctuations.  - Schedule therapy within our clinic for further evaluation and management of mood disorders.    Follow-up:  - Follow up in 3 months or sooner if necessary.    Notes & Risk Factors:  - Increased anxiety due to moving and life changes.  - Concerns about potential bipolar disorder due to family history.  - Protective factors include ongoing medication management and planned therapy.    Functional Status: Moderate impairment     Prognosis: Fair with Ongoing Treatment     Impression/Formulation:  Patient appeared alert and oriented. Patient is receptive to assistance with maintaining a stable lifestyle.  Patient presents with history of     ICD-10-CM ICD-9-CM   1. ADHD (attention deficit hyperactivity disorder), combined type  F90.2 314.01   2. ABELARDO (generalized anxiety disorder)  F41.1 300.02   3. Moderate recurrent major depression  F33.1 296.32   .     Treatment Plan:     The JACQUELINE report, reviewed through PDMP, of the past 12 months were reviewed and is appropriate.  The patient/guardian reports taking the medication only as prescribed.  The patient/guardian denies any abuse or misuse of the medication.  The patient/guardian denies any other substance use or issues.  There are no apparent substance related issues.  The patient reports no side effects of the current medication usage.  The patient/guardian has reported significant improvement with medication usage and wishes to continue medication as prescribed.  The patient/guardian is appropriate to continue with current medication usage at this time.   Reinforced risks and side effects of medication usage, patient and/or guardian verbalize understanding in their own words and are in agreement with current plan.    The patient is being prescribed a controlled substance as part of the treatment plan. The patient/guardian has been educated of appropriate use of the medication(s), including risks and side effects such as insomnia, headache, exacerbation of tics, nervousness, irritability, overstimulation, tremor, dizziness, anorexia or change in appetite, nausea, dry mouth, constipation, diarrhea, weight loss, sexual dysfunction, psychotic episodes, seizures, palpitations, tachycardia, hypertension, rare activation (activation of hypomania, lexx, and/or suicidal ideations), cardiovascular adverse effects including sudden death (especially patients with pre-existing structural abnormalities often associated with a family history of cardiac disease), may slow normal growth in children, weight gain is reported but not expected, sedation is possible but activation is much more common, metabolic adversities, and overdose among others. Patient/guardian is also informed that the medication is to be used by the patient only, the medication is to be used only as directed, and the medication should not be combined with other substances unless directed by a Provider/Prescriber. The patient/guardian verbalized understanding and agreement with this in their own words, and wish to continue with current treatment plan.    Discussed medication options and treatment plan of prescribed medication, any off label use of medication, as well as the risks, benefits, any black box warnings including increased suicidality, and side effects including but not limited to potential falls, dizziness, possible impaired driving, GI side effects (change in appetite, abdominal discomfort, nausea, vomiting, diarrhea, and/or constipation), dry mouth, somnolence, sedation, insomnia, activation,  agitation, irritation, tremors, abnormal muscle movements or disorders, headache, sweating, possible bruising or rare bleeding, electrolyte and/or fluid abnormalities, change in blood pressure/heart rate/and or heart rhythm, sexual dysfunction, and metabolic adversities among others. Patient and/or guardian agreeable to call the office with any worsening of symptoms or onset of side effects, or if any concerns or questions arise.  The contact information for the office is made available to the patient and/or guardian.  Patient and/or guardian agreeable to call 911 or go to the nearest ER should they begin having any SI/HI, or if any urgent concerns arise. No medication side effects or related complaints today.    GOALS:  Short Term Goals: Patient will be compliant with medication, and patient will have no significant medication related side effects.  Patient will be engaged in psychotherapy as indicated.  Patient will report subjective improvement of symptoms.  Long term goals: To stabilize mood and treat/improve subjective symptoms, the patient will stay out of the hospital, the patient will be at an optimal level of functioning, and the patient will take all medications as prescribed.  The patient/guardian verbalized understanding and agreement with goals that were mutually set.     Patient will continue supportive psychotherapy efforts and medications as indicated. Clinic will obtain release of information for current treatment team for continuity of care as needed. Patient will contact this office, call 911 or present to the nearest emergency room should suicidal or homicidal ideations occur.  Discussed medication options and treatment plan of prescribed medication(s) as well as the risks, benefits, and potential side effects. Patient ackowledged and verbally consented to continue with current treatment plan and was educated on the importance of compliance with treatment and follow-up appointments.     I spent  50 minutes caring for Carrie on this date of service. This time includes time spent by me in the following activities: preparing for the visit, reviewing tests, obtaining and/or reviewing a separately obtained history, performing a medically appropriate examination and/or evaluation, counseling and educating the patient/family/caregiver, ordering medications, tests, or procedures, referring and communicating with other health care professionals, documenting information in the medical record, and care coordination.       Follow Up:   Return in about 3 months (around 9/9/2025) for Med Check.      RICKEY Tobar  [facility]     This is electronically signed by RICKEY Tobar   06/10/2025 17:50 EDT    Patient or patient representative verbalized consent for the use of Ambient Listening during the visit with  RICKEY Gonzalez for chart documentation. 6/10/2025  17:49 EDT    Part of this note may be an electronic transcription/translation of spoken language to printed text using the Dragon Dictation System.

## 2025-06-12 ENCOUNTER — TELEMEDICINE (OUTPATIENT)
Dept: PSYCHIATRY | Facility: CLINIC | Age: 25
End: 2025-06-12
Payer: COMMERCIAL

## 2025-06-12 DIAGNOSIS — F90.2 ADHD (ATTENTION DEFICIT HYPERACTIVITY DISORDER), COMBINED TYPE: Primary | ICD-10-CM

## 2025-06-12 DIAGNOSIS — F33.1 MDD (MAJOR DEPRESSIVE DISORDER), RECURRENT EPISODE, MODERATE: ICD-10-CM

## 2025-06-12 DIAGNOSIS — F41.1 GAD (GENERALIZED ANXIETY DISORDER): ICD-10-CM

## 2025-06-12 NOTE — PROGRESS NOTES
Video Visit Adult       Date Encounter: 2025   Name: Carrie Ramirez  MRN: [id number]  : 2000    Time In: 8:00 AM  Time Out: 8:59 AM     Referring Provider: Ana Markham APRN    Chief Complaint:      ICD-10-CM ICD-9-CM   1. ADHD (attention deficit hyperactivity disorder), combined type  F90.2 314.01   2. ABELARDO (generalized anxiety disorder)  F41.1 300.02   3. MDD (major depressive disorder), recurrent episode, moderate  F33.1 296.32        The provider is located at [facility] [address], [address], [place], [place] using a secure [employer] Video Visit through [employer] for assessment with Luba Barrow LCSW, MSW.  The Patient is seen remotely at their home address in [place], using a private computer, phone or tablet.  Patient is being seen remotely via telehealth at home address in [place] and stated they are in a secure environment for this session using [facility] Chart. The patient's condition being diagnosed/treated is appropriate for telemedicine. The provider identified self as well as credentials. The patient, and/or patients guardian, consent to be seen remotely, and when consent is given they understand that the consent allows for patient identifiable information to be sent to a third party as needed. They may refuse to be seen remotely at any time. The electronic data is encrypted and password protected, and the patient and/or guardian has been advised of the potential risks to privacy not withstanding such measures.     You have chosen to receive care through a video visit today. Do you consent to use a video visit for your medical care today? Pt agrees.   This visit has been scheduled as a video visit to comply with patient safety concerns in accordance with CDC recommendations.      History of Present Illness:   Carrie Ramirez is a 25 y.o. adult who is being seen today for initial individual session. Pt described suffering from ABELARDO, MDD moderate  recurrent, and ADHD combined type. Pt reports that he is a  and has a few more classes and he will graduate. Pt does describe stress due to moving and some financial stress. Pt did discuss some concern about a possible bipolar diagnosis due to his mother having this diagnosis at his age however pt rarely engages in high -risk behaviors. Pt denies any past suicide attempts or self harming behaviors. Pt reports his father  15 years ago and a fractured relationship with his mother. Pt describes a fractured relationship with his mother due to her being transphobic. Tx plan also completed during this session.       Subjective     Patient's Support Network Includes:  significant other and friend(s)    Legal History:  The patient has no significant history of legal issues.    Past Mental/Behavioral Health History:   History of prior/current treatment: Yes.   History of prior/current outpatient therapy: Yes. Positive experience  History of prior inpatient hospitalizations: No   History of suicide/self harm attempts: No   Past diagnoses: ADHD (combined), MDD recurrent Moderate, and   Past medications: See Chart     Abuse/Trauma History:   Physical abuse: N/A  Sexual abuse: N/A  Emotional/Neglect: Narcicisstic Mother  Trauma: n/a  Death/Loss of relationship: Father  15 years ago    History of Substance Use:  Alcohol: N/A  Tobacco/Vape: N/A  Illicit Drugs: N/A     Social History:   Current living situation: Live with partner and roommate.   Where was patient born: [place]   Relationship with family members: Fractured   Difficulty making new/maintaining friendships: No  Congregational: No  Current leisure and recreation: Video games, and watch tv/movies/shows.     Education/Work History:    Level of education: Masters   Has patient experienced any issues or problems at work: No  IEP/504: N/A  Any  history family or personal: N/A   Current occupation: .     Family History:  Family  "History   Problem Relation Age of Onset    Arthritis Mother     Hypertension Mother     Drug abuse Mother     Drug abuse Father          in house fire    Drug abuse Sister     ADD / ADHD Brother       Substance Use/Abuse: N/A   Suicide attempts/completions: N/A    Social History:   Social History     Socioeconomic History    Marital status: Single   Tobacco Use    Smoking status: Never     Passive exposure: Never    Smokeless tobacco: Never   Vaping Use    Vaping status: Never Used   Substance and Sexual Activity    Alcohol use: Yes     Comment: occasionally / socially 1 or 2 drinks    Drug use: Never    Sexual activity: Defer       Are there any significant health issues (current or past): N/A   History of seizures: N/A     Past Surgical History:   No past surgical history on file.    Medications:     Current Outpatient Medications:     amphetamine-dextroamphetamine XR (ADDERALL XR) 20 MG 24 hr capsule, Take 1 capsule by mouth Daily for 30 days, Disp: 30 capsule, Rfl: 0    B-D 3CC LUER-GAIL SYR 25GX5/8\" 25G X 5/8\" 3 ML misc, USE FOR SUBCUTANEOUS INJECTION OF MEDICATION WEEKLY AS DIRECTED, Disp: , Rfl:     B-D TB SYRINGE 1CC/27GX1/2\" 27G X 1/2\" 1 ML misc, USE FOR DRAWING UP AND INJECTING MEDICATION, Disp: , Rfl:     BD Hypodermic Needle 18G X 1\" misc, USE TO DRAW UP MEDICATION FOR INJECTION WEEKLY, Disp: , Rfl:     Biotin 1000 MCG chewable tablet, Chew., Disp: , Rfl:     buPROPion XL (WELLBUTRIN XL) 150 MG 24 hr tablet, Take 1 tablet by mouth Daily., Disp: 30 tablet, Rfl: 2    busPIRone (BUSPAR) 10 MG tablet, TAKE 1 TABLET BY MOUTH 2 (TWO) TIMES A DAY AS NEEDED (ANXIETY)., Disp: 60 tablet, Rfl: 2    Calcium Carbonate 1500 (600 Ca) MG tablet, Take 1 tablet by mouth Daily., Disp: 90 tablet, Rfl: 1    citalopram (CeleXA) 20 MG tablet, Take 1 tablet by mouth Daily., Disp: 30 tablet, Rfl: 2    estradiol (ESTRACE) 0.1 MG/GM vaginal cream, INSERT 1 GRAM VAGINALLY 2 TIMES EVERY WEEK, Disp: , Rfl:     ibuprofen " (ADVIL,MOTRIN) 600 MG tablet, Take 1 tablet by mouth Every 6 (Six) Hours As Needed for Mild Pain., Disp: , Rfl:     levothyroxine (SYNTHROID, LEVOTHROID) 75 MCG tablet, Take 1 tablet by mouth Daily., Disp: 90 tablet, Rfl: 1    minoxidil (LONITEN) 2.5 MG tablet, Take 1 tablet by mouth Daily., Disp: 90 tablet, Rfl: 1    propranolol (INDERAL) 10 MG tablet, Take 1 tablet by mouth 2 (Two) Times a Day As Needed (anxiety)., Disp: 60 tablet, Rfl: 2    Testosterone Cypionate (DEPOTESTOTERONE CYPIONATE) 200 MG/ML injection, 1 mL 1 (One) Time Per Week. 0.25 ml (Patient taking differently: 1 mL 1 (One) Time Per Week. 0.45 ml), Disp: , Rfl:     vitamin D (ERGOCALCIFEROL) 1.25 MG (74033 UT) capsule capsule, TAKE 1 CAPSULE BY MOUTH 1 TIME PER WEEK., Disp: 12 capsule, Rfl: 1    Allergies:   Allergies   Allergen Reactions    Eucalyptus Oil Dermatitis, Itching, Rash and Shortness Of Breath        Objective       Mental Status Exam:   MENTAL STATUS EXAM   General Appearance:  Cleanly groomed and dressed  Eye Contact:  Good eye contact  Attitude:  Cooperative and polite  Motor Activity:  Normal gait, posture  Muscle Strength:  Normal  Speech:  Normal rate, tone, volume  Mood and affect:  Normal, pleasant  Hopelessness:  Denies  Loneliness: Denies  Thought Process:  Logical  Associations/ Thought Content:  No delusions  Hallucinations:  None  Suicidal Ideations:  Not present  Homicidal Ideation:  Not present  Sensorium:  Alert and clear  Orientation:  Person, situation, time and place  Immediate Recall, Recent, and Remote Memory:  Intact  Attention Span/ Concentration:  Good  Fund of Knowledge:  Appropriate for age and educational level  Intellectual Functioning:  Average range  Insight:  Good  Judgement:  Good  Reliability:  Good  Impulse Control:  Good      SUICIDE RISK ASSESSMENT/CSSRS  1. Does patient have thoughts of suicide? no  2. Does patient have intent for suicide? no  3. Does patient have a current plan for suicide? no  4.  History of suicide attempts: no  5. Family history of suicide or attempts: no  6. History of violent behaviors towards others or property or thoughts of committing suicide: no  7. History of sexual aggression toward others: no  8. Access to firearms or weapons: no    Assessment / Plan      Visit Diagnosis/Orders Placed This Visit:    ICD-10-CM ICD-9-CM   1. ADHD (attention deficit hyperactivity disorder), combined type  F90.2 314.01   2. ABELARDO (generalized anxiety disorder)  F41.1 300.02   3. MDD (major depressive disorder), recurrent episode, moderate  F33.1 296.32        PLAN:    Progress toward goal: Not at goal    Prognosis: Good with ongoing treatment    Safety: No acute safety concerns.  Therapist will continue to monitor.    Risk Assessment: Risk of self-harm acutely is low. Risk of self-harm chronically is also low, but could be further elevated in the event of treatment noncompliance and/or AODA.    Treatment Plan/Goals: Continue supportive psychotherapy efforts and medications as indicated. Treatment and medication options discussed during today's visit. Patient acknowledged and verbally consented to continue with current treatment plan and was educated on the importance of compliance with treatment and follow-up appointments. Patient seems reasonably able to adhere to treatment plan.      Assisted Patient in processing above session content; acknowledged and normalized patient’s thoughts, feelings, and concerns.     Allowed Patient to freely discuss issues  without interruption or judgement with unconditional positive regard, active listening skills, and empathy. Therapist provided a safe, confidential environment to facilitate the development of a positive therapeutic relationship and encouraged open, honest communication. Assisted Patient in identifying risk factors which would indicate the need for higher level of care including thoughts to harm self or others and/or self-harming behavior and encouraged  Patient to contact this office, call 911, or present to the nearest emergency room should any of these events occur. Discussed crisis intervention services and means to access. Patient adamantly and convincingly denies current suicidal or homicidal ideation or perceptual disturbance. Assisted Patient in processing session content; acknowledged and normalized Patient’s thoughts, feelings, and concerns by utilizing a person-centered approach in efforts to build appropriate rapport and a positive therapeutic relationship with open and honest communication.     Follow Up:   No follow-ups on file.         This document has been electronically signed by Luba Maradiaga LCSW  June 12, 2025 09:07 EDT

## 2025-06-12 NOTE — TREATMENT PLAN
Multi-Disciplinary Problems (from Behavioral Health Treatment Plan)      Active Problems       Problem: Anxiety  Start Date: 06/12/25      Problem Details: The patient self-scales this problem as a 6 with 10 being the worst.          Goal Priority Start Date Expected End Date End Date    Patient will develop and implement behavioral and cognitive strategies to reduce anxiety and irrational fears. -- 06/12/25 12/11/25 --    Goal Details: Progress toward goal:  Not appropriate to rate progress toward goal since this is the initial treatment plan.        Goal Intervention Frequency Start Date End Date    Help patient explore past emotional issues in relation to present anxiety. Weekly 06/12/25 --    Intervention Details: Duration of treatment until discharged.        Goal Intervention Frequency Start Date End Date    Help patient develop an awareness of their cognitive and physical responses to anxiety. Weekly 06/12/25 --    Intervention Details: Duration of treatment until discharged.                Problem: Depression  Start Date: 06/12/25      Problem Details: The patient self-scales this problem as a 4 with 10 being the worst.          Goal Priority Start Date Expected End Date End Date    Patient will demonstrate the ability to initiate new constructive life skills outside of sessions on a consistent basis. -- 06/12/25 12/11/25 --    Goal Details: Progress toward goal:  Not appropriate to rate progress toward goal since this is the initial treatment plan.        Goal Intervention Frequency Start Date End Date    Assist patient in setting attainable activities of daily living goals. PRN 06/12/25 --      Goal Intervention Frequency Start Date End Date    Provide education about depression Weekly 06/12/25 --    Intervention Details: Duration of treatment until discharged.        Goal Intervention Frequency Start Date End Date    Assist patient in developing healthy coping strategies. Weekly 06/12/25 --     Intervention Details: Duration of treatment until discharged.                               I have discussed and reviewed this treatment plan with the patient.

## 2025-06-23 ENCOUNTER — TELEPHONE (OUTPATIENT)
Dept: NEUROLOGY | Facility: CLINIC | Age: 25
End: 2025-06-23

## 2025-06-23 NOTE — TELEPHONE ENCOUNTER
Caller: Carrie Ramirez    Relationship:  Self    Best call back number: [phone]     PATIENT CALLED REQUESTING TO CANCEL SAME DAY APPT.    Did the patient call AFTER the start time of their scheduled appointment?  []YES  [x]NO    Was the patient's appointment rescheduled? [x]YES  []NO    Any additional information: PT STATED THEY WERE SICK, AND UNABLE TO COME IN TO DAY

## 2025-07-14 DIAGNOSIS — F90.2 ADHD (ATTENTION DEFICIT HYPERACTIVITY DISORDER), COMBINED TYPE: Chronic | ICD-10-CM

## 2025-07-14 RX ORDER — DEXTROAMPHETAMINE SACCHARATE, AMPHETAMINE ASPARTATE MONOHYDRATE, DEXTROAMPHETAMINE SULFATE AND AMPHETAMINE SULFATE 5; 5; 5; 5 MG/1; MG/1; MG/1; MG/1
20 CAPSULE, EXTENDED RELEASE ORAL DAILY
Qty: 30 CAPSULE | Refills: 0 | Status: SHIPPED | OUTPATIENT
Start: 2025-07-14 | End: 2025-08-13

## 2025-07-16 ENCOUNTER — OFFICE VISIT (OUTPATIENT)
Dept: INTERNAL MEDICINE | Facility: CLINIC | Age: 25
End: 2025-07-16
Payer: COMMERCIAL

## 2025-07-16 ENCOUNTER — TELEMEDICINE (OUTPATIENT)
Dept: PSYCHIATRY | Facility: CLINIC | Age: 25
End: 2025-07-16
Payer: COMMERCIAL

## 2025-07-16 VITALS
OXYGEN SATURATION: 96 % | SYSTOLIC BLOOD PRESSURE: 130 MMHG | BODY MASS INDEX: 38.61 KG/M2 | HEART RATE: 76 BPM | DIASTOLIC BLOOD PRESSURE: 70 MMHG | HEIGHT: 67 IN | WEIGHT: 246 LBS | TEMPERATURE: 98.2 F

## 2025-07-16 DIAGNOSIS — E55.9 VITAMIN D DEFICIENCY: ICD-10-CM

## 2025-07-16 DIAGNOSIS — R06.81 WITNESSED EPISODE OF APNEA: ICD-10-CM

## 2025-07-16 DIAGNOSIS — Z00.00 ANNUAL PHYSICAL EXAM: Primary | ICD-10-CM

## 2025-07-16 DIAGNOSIS — F41.1 GAD (GENERALIZED ANXIETY DISORDER): ICD-10-CM

## 2025-07-16 DIAGNOSIS — Z13.228 SCREENING FOR ENDOCRINE, METABOLIC AND IMMUNITY DISORDER: ICD-10-CM

## 2025-07-16 DIAGNOSIS — E66.812 CLASS 2 SEVERE OBESITY DUE TO EXCESS CALORIES WITH SERIOUS COMORBIDITY AND BODY MASS INDEX (BMI) OF 38.0 TO 38.9 IN ADULT: ICD-10-CM

## 2025-07-16 DIAGNOSIS — Z12.4 SCREENING FOR CERVICAL CANCER: ICD-10-CM

## 2025-07-16 DIAGNOSIS — Z13.0 SCREENING FOR ENDOCRINE, METABOLIC AND IMMUNITY DISORDER: ICD-10-CM

## 2025-07-16 DIAGNOSIS — F33.1 MDD (MAJOR DEPRESSIVE DISORDER), RECURRENT EPISODE, MODERATE: ICD-10-CM

## 2025-07-16 DIAGNOSIS — Z13.29 SCREENING FOR ENDOCRINE, METABOLIC AND IMMUNITY DISORDER: ICD-10-CM

## 2025-07-16 DIAGNOSIS — E66.01 CLASS 2 SEVERE OBESITY DUE TO EXCESS CALORIES WITH SERIOUS COMORBIDITY AND BODY MASS INDEX (BMI) OF 38.0 TO 38.9 IN ADULT: ICD-10-CM

## 2025-07-16 DIAGNOSIS — E03.9 HYPOTHYROIDISM, UNSPECIFIED TYPE: ICD-10-CM

## 2025-07-16 DIAGNOSIS — F90.2 ADHD (ATTENTION DEFICIT HYPERACTIVITY DISORDER), COMBINED TYPE: Primary | ICD-10-CM

## 2025-07-16 DIAGNOSIS — L65.9 ALOPECIA: ICD-10-CM

## 2025-07-16 DIAGNOSIS — F64.0 GENDER DYSPHORIA IN ADULT: ICD-10-CM

## 2025-07-16 DIAGNOSIS — Z13.0 SCREENING FOR DISORDER OF BLOOD AND BLOOD-FORMING ORGANS: ICD-10-CM

## 2025-07-16 DIAGNOSIS — R53.83 OTHER FATIGUE: ICD-10-CM

## 2025-07-16 NOTE — PROGRESS NOTES
Follow Up Video Visit     Date: 2025   Patient Name: Carrie Ramirez  : 2000   MRN: [id number]   Time In: 8:05 AM      Time Out: 8:59 AM     Referring Physician: Ana Markham APRN    Chief Complaint:      ICD-10-CM ICD-9-CM   1. ADHD (attention deficit hyperactivity disorder), combined type  F90.2 314.01   2. ABELARDO (generalized anxiety disorder)  F41.1 300.02   3. MDD (major depressive disorder), recurrent episode, moderate  F33.1 296.32        The provider is located at [facility] [address], [address], [place], [place] using a secure [facility] Video Visit through [employer] for assessment with Luba Barrow LCSW, MSW.  The Patient is seen remotely at their home address in [place], using a private computer, phone or tablet.  Patient is being seen remotely via telehealth at home address in [place] and stated they are in a secure environment for this session using [facility] Chart. The patient's condition being diagnosed/treated is appropriate for telemedicine. The provider identified self as well as credentials. The patient, and/or patients guardian, consent to be seen remotely, and when consent is given they understand that the consent allows for patient identifiable information to be sent to a third party as needed. They may refuse to be seen remotely at any time. The electronic data is encrypted and password protected, and the patient and/or guardian has been advised of the potential risks to privacy not withstanding such measures.     Patient has chosen to receive care through a telehealth video visit and consents to use an audio/video connection for care today?   This visit has been scheduled as a video visit to comply with patient safety concerns in accordance with CDC recommendations.    History of Present Illness: Carrie Ramirez is a 25 y.o. adult presents via video visit today for individual session. Pt describes things being difficult since our last  "session. Pt feels that their depression has been much worse recently. Pt reports that they took na incomplete on two courses and this was very difficult for them. Pt describes lack of motivation and the inability to accomplish specific things and cites executive dysfunction being difficult. Pt reports that their sleep hygiene has not been good and has impacted how he feels. Pt talked about situational stressors such as interpersonal relationships, job searching, maintaining relationships and working. LCSW and pt did discuss self-care routine. LCSW and pt identified a goal to work on and pt chose to work on and target sleep hygiene and work towards improvement by the next session. Pt denies any SI/HI.       Subjective      Review of Systems:   The following portions of the patient's history were reviewed and updated as appropriate: allergies, current medications, past family history, past medical history, past social history, past surgical history and problem list.     Interval History:   Deteriorated    Patient's Support Network Includes:  significant other    Functional Status: Severe impairment    Medications:     Current Outpatient Medications:     amphetamine-dextroamphetamine XR (ADDERALL XR) 20 MG 24 hr capsule, Take 1 capsule by mouth Daily for 30 days, Disp: 30 capsule, Rfl: 0    B-D 3CC LUER-GAIL SYR 25GX5/8\" 25G X 5/8\" 3 ML misc, USE FOR SUBCUTANEOUS INJECTION OF MEDICATION WEEKLY AS DIRECTED, Disp: , Rfl:     B-D TB SYRINGE 1CC/27GX1/2\" 27G X 1/2\" 1 ML misc, USE FOR DRAWING UP AND INJECTING MEDICATION, Disp: , Rfl:     BD Hypodermic Needle 18G X 1\" misc, USE TO DRAW UP MEDICATION FOR INJECTION WEEKLY, Disp: , Rfl:     Biotin 1000 MCG chewable tablet, Chew., Disp: , Rfl:     buPROPion XL (WELLBUTRIN XL) 150 MG 24 hr tablet, Take 1 tablet by mouth Daily., Disp: 30 tablet, Rfl: 2    busPIRone (BUSPAR) 10 MG tablet, TAKE 1 TABLET BY MOUTH 2 (TWO) TIMES A DAY AS NEEDED (ANXIETY)., Disp: 60 tablet, Rfl: 2    " Calcium Carbonate 1500 (600 Ca) MG tablet, Take 1 tablet by mouth Daily., Disp: 90 tablet, Rfl: 1    citalopram (CeleXA) 20 MG tablet, Take 1 tablet by mouth Daily., Disp: 30 tablet, Rfl: 2    estradiol (ESTRACE) 0.1 MG/GM vaginal cream, INSERT 1 GRAM VAGINALLY 2 TIMES EVERY WEEK, Disp: , Rfl:     ibuprofen (ADVIL,MOTRIN) 600 MG tablet, Take 1 tablet by mouth Every 6 (Six) Hours As Needed for Mild Pain., Disp: , Rfl:     levothyroxine (SYNTHROID, LEVOTHROID) 75 MCG tablet, Take 1 tablet by mouth Daily., Disp: 90 tablet, Rfl: 1    minoxidil (LONITEN) 2.5 MG tablet, Take 1 tablet by mouth Daily., Disp: 90 tablet, Rfl: 1    propranolol (INDERAL) 10 MG tablet, Take 1 tablet by mouth 2 (Two) Times a Day As Needed (anxiety)., Disp: 60 tablet, Rfl: 2    Testosterone Cypionate (DEPOTESTOTERONE CYPIONATE) 200 MG/ML injection, 1 mL 1 (One) Time Per Week. 0.25 ml (Patient taking differently: 1 mL 1 (One) Time Per Week. 0.45 ml), Disp: , Rfl:     vitamin D (ERGOCALCIFEROL) 1.25 MG (91934 UT) capsule capsule, TAKE 1 CAPSULE BY MOUTH 1 TIME PER WEEK., Disp: 12 capsule, Rfl: 1      Objective     Mental Status Exam:   MENTAL STATUS EXAM   General Appearance:  Cleanly groomed and dressed  Eye Contact:  Good eye contact  Attitude:  Cooperative and polite  Motor Activity:  Normal gait, posture  Muscle Strength:  Normal  Speech:  Normal rate, tone, volume  Mood and affect:  Normal, pleasant  Hopelessness:  Denies  Loneliness: Denies  Thought Process:  Logical  Associations/ Thought Content:  No delusions  Hallucinations:  None  Suicidal Ideations:  Not present  Homicidal Ideation:  Not present  Sensorium:  Alert and clear  Orientation:  Person  Immediate Recall, Recent, and Remote Memory:  Intact  Attention Span/ Concentration:  Good  Fund of Knowledge:  Appropriate for age and educational level  Intellectual Functioning:  Average range  Insight:  Good  Judgement:  Good  Reliability:  Good  Impulse Control:  Good       Assessment /  Plan      Visit Diagnosis/Orders Placed This Visit:    ICD-10-CM ICD-9-CM   1. ADHD (attention deficit hyperactivity disorder), combined type  F90.2 314.01   2. ABELARDO (generalized anxiety disorder)  F41.1 300.02   3. MDD (major depressive disorder), recurrent episode, moderate  F33.1 296.32        PLAN:    Progress toward goal: Not at goal    Prognosis: Good with ongoing treatment    PLAN:  Safety: No acute safety concerns.  Therapist will continue to monitor.    Risk Assessment: Risk of self-harm acutely is low. Risk of self-harm chronically is also low, but could be further elevated in the event of treatment noncompliance and/or AODA.     TREATMENT PLAN/GOALS: Continue supportive psychotherapy efforts and medications as indicated. Treatment and medication options discussed during today's visit. Patient ackowledged and verbally consented to continue with current treatment plan and was educated on the importance of compliance with treatment and follow-up appointments. Patient seems reasonably able to adhere to treatment plan.      Allowed Patient to freely discuss issues  without interruption or judgement with unconditional positive regard, active listening skills, and empathy. Therapist provided a safe, confidential environment to facilitate the development of a positive therapeutic relationship and encouraged open, honest communication. Assisted Patient in identifying risk factors which would indicate the need for higher level of care including thoughts to harm self or others and/or self-harming behavior and encouraged Patient to contact this office, call 911, or present to the nearest emergency room should any of these events occur. Discussed crisis intervention services and means to access. Patient adamantly and convincingly denies current suicidal or homicidal ideation or perceptual disturbance. Assisted Patient in processing session content; acknowledged and normalized Patient’s thoughts, feelings, and concerns  by utilizing a person-centered approach in efforts to build appropriate rapport and a positive therapeutic relationship with open and honest communication.     Follow Up:   Return in about 4 weeks (around 8/13/2025) for Video visit, therapy session.        This document has been electronically signed by Luba Maradiaga LCSW  July 16, 2025 10:07 EDT

## 2025-07-16 NOTE — PROGRESS NOTES
Chief Complaint   Patient presents with    Annual Exam       Carrie Ramirez is a 25 y.o. adult and is here for a comprehensive physical exam.     Hypothyroid. Feeling tired. Legs and feet have been swelling.  Brain fog has worsened.  Symptoms are reminiscent of those experienced prior to starting levothyroxine.  Continues to have hair loss on scalp, despite taking minoxidil PO for 6 months as prescribed.  Denies temperature intolerance, skin/nail changes, bowel habit changes, palpitations, anxiety, sore throat, hoarseness.     Sleep study rescheduled. Witnessed apnea. Falls asleep quickly when not moving or talking.      ADHD, anxiety.  Taking propranolol, adderall XR, bupropion, buspar, citalopram as prescribed by PMHNP. Lots of life changes recently. Lack of motivation, getting overwhelmed.  Finding it difficult to unpack from recent move, do homework, go to bed on time.  Going to counseling.  Denies anhedonia, feelings of worthlessness, difficulty concentrating, impaired memory, SI, HI, panic attacks.         History:  LMP: , when started taking testosterone.   Menarche: 8-10 years old  Sexual activity:  mutually monogamous relationship  Copper IUD placed in [facility] in February.  Expelled without pain within the first week of having it placed. Slight spotting afterward.   Last pap date: never   : 0  Para: 0    Health Habits:  Dental Exam. not up to date - brushes 1-2 x a day.  No local dentist covered by insurance.  Eye Exam. up to date, wears glasses  Exercise: 2 times/week.  Current exercise activities include: cardiovascular workout on exercise equipment  Do you take any herbs or supplements that were not prescribed by a doctor? Calcium and vitamin D     Health Maintenance   Topic Date Due    HEPATITIS C SCREENING  Never done    Orthopox/Mpox (2 - Risk post-exposure 2-dose series) 11/15/2025 (Originally 2024)    MENINGOCOCCAL B VACCINE (2 of 2 - Bexsero SCDM 2-dose series)  01/12/2026 (Originally 3/17/2017)    TDAP/TD VACCINES (2 - Td or Tdap) 01/12/2026 (Originally 5/25/2022)    COVID-19 Vaccine (5 - 2024-25 season) 01/16/2026 (Originally 9/1/2024)    INFLUENZA VACCINE  10/01/2025    ANNUAL PHYSICAL  07/16/2026    PAP SMEAR  07/16/2028    HPV VACCINES  Completed    Pneumococcal Vaccine 0-49  Aged Out    CHLAMYDIA SCREENING  Discontinued       PMH, PSH, SocHx, FamHx, Allergies, and Medications: Reviewed and updated in the Visit Navigator.     Allergies   Allergen Reactions    Eucalyptus Oil Dermatitis, Itching, Rash and Shortness Of Breath     Past Medical History:   Diagnosis Date    ADHD (attention deficit hyperactivity disorder)     Anemia 2015    Inconsistent iron levels: 12.5 to 13.7    Anxiety     Bulimia nervosa     Since early/mid teens, binge and purging behaviors are concurrent with periods of intense, prolonged anxiety; symptoms are typically present yet not as intense.    Depression     Hypothyroid     Obesity 2017    PTSD (post-traumatic stress disorder)      History reviewed. No pertinent surgical history.  Social History     Socioeconomic History    Marital status:    Tobacco Use    Smoking status: Never     Passive exposure: Never    Smokeless tobacco: Never   Vaping Use    Vaping status: Never Used   Substance and Sexual Activity    Alcohol use: Yes     Comment: infrequent (every ~2 weeks), socially only    Drug use: Never    Sexual activity: Not Currently     Partners: Female     Birth control/protection: None, Partner of same sex     Family History   Problem Relation Age of Onset    Arthritis Mother     Hypertension Mother     Drug abuse Mother         Cannabinoids    Alcohol abuse Mother     Anxiety disorder Mother     Depression Mother     Mental illness Mother     Thyroid disease Mother         Hypothyroidism    Bipolar disorder Mother     Paranoid behavior Mother     Self-Injurious Behavior  Mother     Suicide Attempts Mother     Migraines Mother     Drug  "abuse Father          in house fire    Alcohol abuse Father     Anxiety disorder Father     Depression Father     Mental illness Father     ADD / ADHD Father     Suicide Attempts Father     Drug abuse Sister         Cannabinoids    Depression Sister     Self-Injurious Behavior  Sister     Suicide Attempts Sister     Migraines Sister     ADD / ADHD Brother     Alcohol abuse Maternal Grandfather         Periodical alcohol abuse; onset post-war.    Cancer Maternal Grandfather         Lung cancer, previously heavy smoker.    Depression Maternal Grandfather         War-developed.    Diabetes Maternal Grandfather     Hearing loss Maternal Grandfather         War-induced.    Mental illness Maternal Grandfather     Paranoid behavior Maternal Grandfather         Paranoid Behaviors, likely stemming from war-related PTSD.    Suicide Attempts Maternal Grandfather     Anxiety disorder Maternal Grandmother     Depression Maternal Grandmother     Mental illness Maternal Grandmother     Stroke Maternal Grandmother         1-2 TIA    Thyroid disease Maternal Grandmother         Hypothyroidism; Hysterectomy    Dementia Maternal Grandmother         Some symptoms began showing in her 50s, but moreso 60 and beyond.    Seizures Maternal Grandmother     Migraines Maternal Grandmother        Review of Systems  Review of Systems   All other systems reviewed and are negative.      Objective   /70   Pulse 76   Temp 98.2 °F (36.8 °C)   Ht 170.2 cm (67.01\")   Wt 112 kg (246 lb)   SpO2 96%   BMI 38.52 kg/m²   Body mass index is 38.52 kg/m².    Physical Exam  Exam conducted with a chaperone present (CMA).   Constitutional:       Appearance: He is well-developed. He is obese. He is not ill-appearing.   HENT:      Head: Normocephalic.      Right Ear: Tympanic membrane, ear canal and external ear normal.      Left Ear: Tympanic membrane, ear canal and external ear normal.      Nose: Nose normal.      Mouth/Throat:      Mouth: Mucous " membranes are moist.      Pharynx: Oropharynx is clear. Uvula midline.   Eyes:      Extraocular Movements: Extraocular movements intact.      Conjunctiva/sclera: Conjunctivae normal.      Pupils: Pupils are equal, round, and reactive to light.   Neck:      Thyroid: No thyromegaly.   Cardiovascular:      Rate and Rhythm: Normal rate and regular rhythm.      Heart sounds: Normal heart sounds.   Pulmonary:      Effort: Pulmonary effort is normal.      Breath sounds: Normal breath sounds.   Chest:   Breasts:     Breasts are symmetrical.   Abdominal:      General: Bowel sounds are normal.      Palpations: Abdomen is soft.      Tenderness: There is no abdominal tenderness.   Genitourinary:     General: Normal vulva.      Exam position: Supine.      Vagina: Normal.      Cervix: Normal.      Adnexa: Right adnexa normal and left adnexa normal.   Musculoskeletal:         General: No tenderness or deformity. Normal range of motion.      Cervical back: Full passive range of motion without pain, normal range of motion and neck supple.   Lymphadenopathy:      Cervical: No cervical adenopathy.      Upper Body:      Right upper body: No supraclavicular, axillary or pectoral adenopathy.      Left upper body: No supraclavicular, axillary or pectoral adenopathy.   Skin:     General: Skin is warm.      Capillary Refill: Capillary refill takes less than 2 seconds.   Neurological:      Mental Status: He is alert and oriented to person, place, and time.      Sensory: No sensory deficit.      Coordination: Coordination normal.      Gait: Gait normal.      Comments: CN II-XII normal   Psychiatric:         Attention and Perception: Attention normal.         Mood and Affect: Mood and affect normal.         Speech: Speech normal.         Behavior: Behavior normal.         Thought Content: Thought content normal.         Assessment & Plan   1. Healthy adult exam.    2. Patient Counseling: Including but not Limited to the following, when  appropriate:  --Nutrition: Stressed importance of moderation in sodium/caffeine intake, saturated fat and cholesterol, caloric balance, sufficient intake of fresh fruits, vegetables, fiber, calcium, iron, and 1 mg of folate supplement per day (for females capable of pregnancy).  --Exercise: Stressed the importance of regular exercise.   --Substance Abuse: Discussed cessation/primary prevention of tobacco, alcohol, or other drug use; driving or other dangerous activities under the influence; availability of treatment for abuse, as indicated based on social history.    --Sexuality: Discussed sexually transmitted diseases, partner selection, use of condoms, avoidance of unintended pregnancy  and contraceptive alternatives.   --Injury prevention: Discussed safety belts, safety helmets, smoke detector, smoking near bedding or upholstery.   --Dental health: Discussed importance of regular tooth brushing, flossing, and dental visits.  --Immunizations reviewed.  --Discussed benefits of colon cancer screening with all patients over the age of 45.  --Discussed benefits of breast cancer screening with all female patients over age of 40  3. Discussed the patient's BMI with him.  The BMI is above average; BMI management plan is completed  4. Return in about 1 year (around 7/16/2026) for Annual.  5. Age-appropriate Screening Scheduled    Assessment & Plan     Diagnoses and all orders for this visit:    1. Annual physical exam (Primary)    2. Hypothyroidism, unspecified type  -     T4, Free  -     TSH    3. Vitamin D deficiency  -     Vitamin D,25-Hydroxy    4. Alopecia  -     T4, Free  -     TSH  -     Vitamin D,25-Hydroxy  -     Vitamin B12  -     Zinc  - Encouraged to discuss with dermatology     5. Other fatigue  -     Vitamin B12  -     Folate  -     Iron Profile w/o Ferritin    6. Witnessed episode of apnea        - Appointment with sleep med scheduled     7. Screening for endocrine, metabolic and immunity disorder  -      Comprehensive Metabolic Panel  -     Magnesium    8. Screening for disorder of blood and blood-forming organs  -     CBC & Differential  -     Vitamin B12  -     Folate  -     Iron Profile w/o Ferritin    9. Screening for cervical cancer  -     LIQUID-BASED PAP SMEAR WITH HPV GENOTYPING IF ASCUS ([facility])    10. Gender dysphoria in adult        - Continue medication as prescribed by endocrinology     11. Class 2 severe obesity due to excess calories with serious comorbidity and body mass index (BMI) of 38.0 to 38.9 in adult        - Class 2 Severe Obesity (BMI >=35 and <=39.9). Obesity-related health conditions include the following: suspected ALANNA. Obesity is improving with lifestyle modifications. BMI is is above average; BMI management plan is completed. We discussed low calorie, low carb based diet program, portion control, and increasing exercise.

## 2025-07-17 LAB — REF LAB TEST METHOD: NORMAL

## 2025-07-18 LAB
25(OH)D3+25(OH)D2 SERPL-MCNC: 43.2 NG/ML (ref 30–100)
ALBUMIN SERPL-MCNC: 4 G/DL (ref 3.5–5.2)
ALBUMIN/GLOB SERPL: 1.3 G/DL
ALP SERPL-CCNC: 123 U/L (ref 39–117)
ALT SERPL-CCNC: 28 U/L (ref 1–33)
AST SERPL-CCNC: 17 U/L (ref 1–32)
BASOPHILS # BLD AUTO: 0.06 10*3/MM3 (ref 0–0.2)
BASOPHILS NFR BLD AUTO: 0.6 % (ref 0–1.5)
BILIRUB SERPL-MCNC: 0.4 MG/DL (ref 0–1.2)
BUN SERPL-MCNC: 18 MG/DL (ref 6–20)
BUN/CREAT SERPL: 13.4 (ref 7–25)
CALCIUM SERPL-MCNC: 9.7 MG/DL (ref 8.6–10.5)
CHLORIDE SERPL-SCNC: 103 MMOL/L (ref 98–107)
CO2 SERPL-SCNC: 26.3 MMOL/L (ref 22–29)
CREAT SERPL-MCNC: 1.34 MG/DL (ref 0.57–1)
EGFRCR SERPLBLD CKD-EPI 2021: 56.9 ML/MIN/1.73
EOSINOPHIL # BLD AUTO: 0.26 10*3/MM3 (ref 0–0.4)
EOSINOPHIL NFR BLD AUTO: 2.6 % (ref 0.3–6.2)
ERYTHROCYTE [DISTWIDTH] IN BLOOD BY AUTOMATED COUNT: 12.8 % (ref 12.3–15.4)
FOLATE SERPL-MCNC: 2.9 NG/ML (ref 4.78–24.2)
GLOBULIN SER CALC-MCNC: 3.1 GM/DL
GLUCOSE SERPL-MCNC: 88 MG/DL (ref 65–99)
HCT VFR BLD AUTO: 46.5 % (ref 34–46.6)
HGB BLD-MCNC: 14.9 G/DL (ref 12–15.9)
IMM GRANULOCYTES # BLD AUTO: 0.02 10*3/MM3 (ref 0–0.05)
IMM GRANULOCYTES NFR BLD AUTO: 0.2 % (ref 0–0.5)
IRON SATN MFR SERPL: 37 % (ref 20–50)
IRON SERPL-MCNC: 138 MCG/DL (ref 37–145)
LYMPHOCYTES # BLD AUTO: 2.77 10*3/MM3 (ref 0.7–3.1)
LYMPHOCYTES NFR BLD AUTO: 28.1 % (ref 19.6–45.3)
MAGNESIUM SERPL-MCNC: 2 MG/DL (ref 1.6–2.6)
MCH RBC QN AUTO: 26 PG (ref 26.6–33)
MCHC RBC AUTO-ENTMCNC: 32 G/DL (ref 31.5–35.7)
MCV RBC AUTO: 81 FL (ref 79–97)
MONOCYTES # BLD AUTO: 0.7 10*3/MM3 (ref 0.1–0.9)
MONOCYTES NFR BLD AUTO: 7.1 % (ref 5–12)
NEUTROPHILS # BLD AUTO: 6.04 10*3/MM3 (ref 1.7–7)
NEUTROPHILS NFR BLD AUTO: 61.4 % (ref 42.7–76)
NRBC BLD AUTO-RTO: 0 /100 WBC (ref 0–0.2)
PLATELET # BLD AUTO: 317 10*3/MM3 (ref 140–450)
POTASSIUM SERPL-SCNC: 4.4 MMOL/L (ref 3.5–5.2)
PROT SERPL-MCNC: 7.1 G/DL (ref 6–8.5)
RBC # BLD AUTO: 5.74 10*6/MM3 (ref 3.77–5.28)
SODIUM SERPL-SCNC: 139 MMOL/L (ref 136–145)
T4 FREE SERPL-MCNC: 1.16 NG/DL (ref 0.92–1.68)
TIBC SERPL-MCNC: 374 MCG/DL
TSH SERPL DL<=0.005 MIU/L-ACNC: 4.32 UIU/ML (ref 0.27–4.2)
UIBC SERPL-MCNC: 236 MCG/DL (ref 112–346)
VIT B12 SERPL-MCNC: 671 PG/ML (ref 211–946)
WBC # BLD AUTO: 9.85 10*3/MM3 (ref 3.4–10.8)
ZINC SERPL-MCNC: 61 UG/DL (ref 44–115)

## 2025-08-11 DIAGNOSIS — L65.9 ALOPECIA: ICD-10-CM

## 2025-08-11 RX ORDER — MINOXIDIL 2.5 MG/1
2.5 TABLET ORAL DAILY
Qty: 90 TABLET | Refills: 1 | Status: SHIPPED | OUTPATIENT
Start: 2025-08-11

## 2025-08-15 DIAGNOSIS — E03.9 HYPOTHYROIDISM, UNSPECIFIED TYPE: ICD-10-CM

## 2025-08-15 RX ORDER — LEVOTHYROXINE SODIUM 100 UG/1
100 TABLET ORAL DAILY
Qty: 90 TABLET | Refills: 1 | Status: SHIPPED | OUTPATIENT
Start: 2025-08-15